# Patient Record
Sex: FEMALE | Race: WHITE | Employment: UNEMPLOYED | RURAL
[De-identification: names, ages, dates, MRNs, and addresses within clinical notes are randomized per-mention and may not be internally consistent; named-entity substitution may affect disease eponyms.]

---

## 2017-01-19 ENCOUNTER — OFFICE VISIT (OUTPATIENT)
Dept: FAMILY MEDICINE CLINIC | Age: 52
End: 2017-01-19

## 2017-01-19 VITALS
SYSTOLIC BLOOD PRESSURE: 120 MMHG | HEART RATE: 71 BPM | HEIGHT: 70 IN | BODY MASS INDEX: 27.35 KG/M2 | DIASTOLIC BLOOD PRESSURE: 80 MMHG | TEMPERATURE: 97.5 F | OXYGEN SATURATION: 97 % | WEIGHT: 191 LBS | RESPIRATION RATE: 16 BRPM

## 2017-01-19 DIAGNOSIS — Z12.11 SCREENING FOR COLON CANCER: ICD-10-CM

## 2017-01-19 DIAGNOSIS — R53.83 MALAISE AND FATIGUE: ICD-10-CM

## 2017-01-19 DIAGNOSIS — R53.81 MALAISE AND FATIGUE: ICD-10-CM

## 2017-01-19 DIAGNOSIS — F41.0 PANIC ATTACKS: Primary | ICD-10-CM

## 2017-01-19 DIAGNOSIS — Z01.419 WELL WOMAN EXAM: ICD-10-CM

## 2017-01-19 DIAGNOSIS — Z00.00 LABORATORY EXAM ORDERED AS PART OF ROUTINE GENERAL MEDICAL EXAMINATION: ICD-10-CM

## 2017-01-19 DIAGNOSIS — Z11.59 SCREENING FOR VIRAL DISEASE: ICD-10-CM

## 2017-01-19 RX ORDER — ESCITALOPRAM OXALATE 10 MG/1
10 TABLET ORAL DAILY
Qty: 90 TAB | Refills: 1 | Status: SHIPPED | OUTPATIENT
Start: 2017-01-19 | End: 2017-04-19

## 2017-01-19 RX ORDER — ESCITALOPRAM OXALATE 10 MG/1
10 TABLET ORAL
COMMUNITY
Start: 2016-11-07 | End: 2017-09-01 | Stop reason: SDUPTHER

## 2017-01-19 NOTE — MR AVS SNAPSHOT
Visit Information Date & Time Provider Department Dept. Phone Encounter #  
 1/19/2017  1:00 PM Bethel Quezada [phone] [id number] Upcoming Health Maintenance Date Due Hepatitis C Screening 1965 DTaP/Tdap/Td series (1 - Tdap) 4/20/1986 PAP AKA CERVICAL CYTOLOGY 4/20/1986 BREAST CANCER SCRN MAMMOGRAM 4/20/2015 FOBT Q 1 YEAR AGE 50-75 4/20/2015 Allergies as of 1/19/2017  Review Complete On: 1/19/2017 By: Dennie Laos, LPN No Known Allergies Current Immunizations  Never Reviewed No immunizations on file. Not reviewed this visit You Were Diagnosed With   
  
 Codes Comments Panic attacks    -  Primary ICD-10-CM: F41.0 ICD-9-CM: 300.01 Well woman exam     ICD-10-CM: I51.364 ICD-9-CM: V72.31 Laboratory exam ordered as part of routine general medical examination     ICD-10-CM: Z00.00 ICD-9-CM: V72.62 Malaise and fatigue     ICD-10-CM: R53.81, R53.83 ICD-9-CM: 780.79 Vitals BP Pulse Temp Resp Height(growth percentile) Weight(growth percentile) 120/80 (BP 1 Location: Right arm, BP Patient Position: Sitting) 71 97.5 °F (36.4 °C) (Oral) 16 5' 10\" (1.778 m) 191 lb (86.6 kg) SpO2 BMI OB Status Smoking Status 97% 27.41 kg/m2 Menopause Former Smoker Vitals History BMI and BSA Data Body Mass Index Body Surface Area  
 27.41 kg/m 2 2.07 m 2 Preferred Pharmacy Pharmacy Name Phone [facility] [address] [place] [address] [phone] Your Updated Medication List  
  
   
This list is accurate as of: 1/19/17  1:34 PM.  Always use your most recent med list.  
  
  
  
  
 aspirin 325 mg tablet Commonly known as:  ASPIRIN Take 325 mg by mouth daily. calcium-cholecalciferol (D3) tablet Commonly known as:  CALTRATE 600+D Take 1 Tab by mouth daily. * escitalopram oxalate 10 mg tablet Commonly known as:  Jeremy Hands Take 10 mg by mouth once over twenty-four (24) hours. * escitalopram oxalate 10 mg tablet Commonly known as:  Tyrell Clifton Take 1 Tab by mouth daily for 90 days. multivitamin tablet Commonly known as:  ONE A DAY Take 1 Tab by mouth daily. omega-3 fatty acids-vitamin e 1,000 mg Cap Take 1 Cap by mouth. * Notice: This list has 2 medication(s) that are the same as other medications prescribed for you. Read the directions carefully, and ask your doctor or other care provider to review them with you. Prescriptions Sent to Pharmacy Refills  
 escitalopram oxalate (LEXAPRO) 10 mg tablet 1 Sig: Take 1 Tab by mouth daily for 90 days. Class: Normal  
 Pharmacy: [id number] #: [phone] Route: Oral  
  
We Performed the Following CBC WITH AUTOMATED DIFF [75924 CPT(R)] CRP, HIGH SENSITIVITY [78876 CPT(R)] LIPID PANEL [56366 CPT(R)] METABOLIC PANEL, COMPREHENSIVE [47128 CPT(R)] THYROID CASCADE PROFILE [[id number] Custom] VITAMIN D, 25 HYDROXY [id number] CPT(R)] Patient Instructions Panic Attacks: Care Instructions Your Care Instructions During a panic attack, you may have a feeling of intense fear or terror, trouble breathing, chest pain or tightness, heartbeat changes, dizziness, sweating, and shaking. A panic attack starts suddenly and usually lasts from 5 to 20 minutes but may last even longer. You have the most anxiety about 10 minutes after the attack starts. An attack can begin with a stressful event, or it can happen without a cause. Although panic attacks can cause scary symptoms, you can learn to manage them with self-care, counseling, and medicine. Follow-up care is a key part of your treatment and safety. Be sure to make and go to all appointments, and call your doctor if you are having problems.  It's also a good idea to know your test results and keep a list of the medicines you take. How can you care for yourself at home? · Take your medicine exactly as directed. Call your doctor if you think you are having a problem with your medicine. · Go to your counseling sessions and follow-up appointments. · Recognize and accept your anxiety. Then, when you are in a situation that makes you anxious, say to yourself, \"This is not an emergency. I feel uncomfortable, but I am not in danger. I can keep going even if I feel anxious. \" · Be kind to your body: ¨ Relieve tension with exercise or a massage. ¨ Get enough rest. 
¨ Avoid alcohol, caffeine, nicotine, and illegal drugs. They can increase your anxiety level, cause sleep problems, or trigger a panic attack. ¨ Learn and do relaxation techniques. See below for more about these techniques. · Engage your mind. Get out and do something you enjoy. Go to a funny movie, or take a walk or hike. Plan your day. Having too much or too little to do can make you anxious. · Keep a record of your symptoms. Discuss your fears with a good friend or family member, or join a support group for people with similar problems. Talking to others sometimes relieves stress. · Get involved in social groups, or volunteer to help others. Being alone sometimes makes things seem worse than they are. · Get at least 30 minutes of exercise on most days of the week to relieve stress. Walking is a good choice. You also may want to do other activities, such as running, swimming, cycling, or playing tennis or team sports. Relaxation techniques Do relaxation exercises for 10 to 20 minutes a day. You can play soothing, relaxing music while you do them, if you wish. · Tell others in your house that you are going to do your relaxation exercises. Ask them not to disturb you. · Find a comfortable place, away from all distractions and noise. · Lie down on your back, or sit with your back straight. · Focus on your breathing. Make it slow and steady. · Breathe in through your nose. Breathe out through either your nose or mouth. · Breathe deeply, filling up the area between your navel and your rib cage. Breathe so that your belly goes up and down. · Do not hold your breath. · Breathe like this for 5 to 10 minutes. Notice the feeling of calmness throughout your whole body. As you continue to breathe slowly and deeply, relax by doing the following for another 5 to 10 minutes: · Tighten and relax each muscle group in your body. You can begin at your toes and work your way up to your head. · Imagine your muscle groups relaxing and becoming heavy. · Empty your mind of all thoughts. · Let yourself relax more and more deeply. · Become aware of the state of calmness that surrounds you. · When your relaxation time is over, you can bring yourself back to alertness by moving your fingers and toes and then your hands and feet and then stretching and moving your entire body. Sometimes people fall asleep during relaxation, but they usually wake up shortly afterward. · Always give yourself time to return to full alertness before you drive a car or do anything that might cause an accident if you are not fully alert. Never play a relaxation tape while driving a car. When should you call for help? Call 911 anytime you think you may need emergency care. For example, call if: 
· You feel you cannot stop from hurting yourself or someone else. Watch closely for changes in your health, and be sure to contact your doctor if: 
· Your panic attacks get worse. · You have new or different anxiety. · You are not getting better as expected. Where can you learn more? Go to http://stu-valentina.info/. Enter H601 in the search box to learn more about \"Panic Attacks: Care Instructions. \" Current as of: July 26, 2016 Content Version: 11.1 © 7205-5497 [employer].  Care instructions adapted under license by [address] (which disclaims liability or warranty for this information). If you have questions about a medical condition or this instruction, always ask your healthcare professional. [address] any warranty or liability for your use of this information. Introducing [facility]! Seema Rodriguez introduces [employer] patient portal. Now you can access parts of your medical record, email your doctor's office, and request medication refills online. 1. In your internet browser, go to [email or web address] 2. Click on the First Time User? Click Here link in the Sign In box. You will see the New Member Sign Up page. 3. Enter your [employer] Access Code exactly as it appears below. You will not need to use this code after youve completed the sign-up process. If you do not sign up before the expiration date, you must request a new code. · [employer] Access Code: [email or web address] Expires: 4/19/2017 12:59 PM 
 
4. Enter the last four digits of your Social Security Number (xxxx) and Date of Birth (mm/dd/yyyy) as indicated and click Submit. You will be taken to the next sign-up page. 5. Create a [employer] ID. This will be your [employer] login ID and cannot be changed, so think of one that is secure and easy to remember. 6. Create a [employer] password. You can change your password at any time. 7. Enter your Password Reset Question and Answer. This can be used at a later time if you forget your password. 8. Enter your e-mail address. You will receive e-mail notification when new information is available in [address]. 9. Click Sign Up. You can now view and download portions of your medical record. 10. Click the Download Summary menu link to download a portable copy of your medical information. If you have questions, please visit the Frequently Asked Questions section of the [employer] website.  Remember, [employer] is NOT to be used for urgent needs. For medical emergencies, dial 911. Now available from your iPhone and Android! Please provide this summary of care documentation to your next provider. Your primary care clinician is listed as Constanza Kruse. If you have any questions after today's visit, please call [phone].

## 2017-01-19 NOTE — PROGRESS NOTES
Identified pt with two pt identifiers(name and ). Chief Complaint   Patient presents with    Panic Attack     is on lexapro to help control panic attacks and is here as she is due for a refill - this was initially prescribed from [facility]  before she moved to [facility] Maintenance Due   Topic    Hepatitis C Screening     DTaP/Tdap/Td series (1 - Tdap)    PAP AKA CERVICAL CYTOLOGY     BREAST CANCER SCRN MAMMOGRAM     FOBT Q 1 YEAR AGE 50-75     INFLUENZA AGE 9 TO ADULT        Wt Readings from Last 3 Encounters:   17 191 lb (86.6 kg)   16 168 lb (76.2 kg)   08/07/15 152 lb (68.9 kg)     Temp Readings from Last 3 Encounters:   16 98.6 °F (37 °C) (Oral)   08/07/15 98.1 °F (36.7 °C)     BP Readings from Last 3 Encounters:   16 108/74   08/07/15 150/83     Pulse Readings from Last 3 Encounters:   16 71   08/07/15 72         Learning Assessment:  :     Learning Assessment 2016   PRIMARY LEARNER Patient   HIGHEST LEVEL OF EDUCATION - PRIMARY LEARNER  DID NOT GRADUATE HIGH SCHOOL   BARRIERS PRIMARY LEARNER NONE   CO-LEARNER CAREGIVER No   PRIMARY LANGUAGE ENGLISH   LEARNER PREFERENCE PRIMARY OTHER (COMMENT)   ANSWERED BY self   RELATIONSHIP SELF       Depression Screening:  :     PHQ 2 / 9, over the last two weeks 2017   Little interest or pleasure in doing things Not at all   Feeling down, depressed or hopeless Not at all   Total Score PHQ 2 0       Fall Risk Assessment:  :     Fall Risk Assessment, last 12 mths 2016   Able to walk? Yes   Fall in past 12 months? No       Abuse Screening:  :     Abuse Screening Questionnaire 2016   Do you ever feel afraid of your partner? N   Are you in a relationship with someone who physically or mentally threatens you? N   Is it safe for you to go home?  Y       Coordination of Care Questionnaire:  :     1) Have you been to an emergency room, urgent care clinic since your last visit? no   Hospitalized since your last visit? no             2) Have you seen or consulted any other health care providers outside of [address] [place] [address] since your last visit? no  (Include any pap smears or colon screenings in this section.)    3) Do you have an Advance Directive on file? no  Are you interested in receiving information about Advance Directives? no    Patient is accompanied by self. I have received verbal consent from Tricia Keller to discuss any/all medical information while they are present in the room. Reviewed record in preparation for visit and have obtained necessary documentation.   Medication reconciliation up to date and corrected with patient at this time.   '

## 2017-01-19 NOTE — PATIENT INSTRUCTIONS
Panic Attacks: Care Instructions  Your Care Instructions  During a panic attack, you may have a feeling of intense fear or terror, trouble breathing, chest pain or tightness, heartbeat changes, dizziness, sweating, and shaking. A panic attack starts suddenly and usually lasts from 5 to 20 minutes but may last even longer. You have the most anxiety about 10 minutes after the attack starts. An attack can begin with a stressful event, or it can happen without a cause. Although panic attacks can cause scary symptoms, you can learn to manage them with self-care, counseling, and medicine. Follow-up care is a key part of your treatment and safety. Be sure to make and go to all appointments, and call your doctor if you are having problems. It's also a good idea to know your test results and keep a list of the medicines you take. How can you care for yourself at home? · Take your medicine exactly as directed. Call your doctor if you think you are having a problem with your medicine. · Go to your counseling sessions and follow-up appointments. · Recognize and accept your anxiety. Then, when you are in a situation that makes you anxious, say to yourself, \"This is not an emergency. I feel uncomfortable, but I am not in danger. I can keep going even if I feel anxious. \"  · Be kind to your body:  ¨ Relieve tension with exercise or a massage. ¨ Get enough rest.  ¨ Avoid alcohol, caffeine, nicotine, and illegal drugs. They can increase your anxiety level, cause sleep problems, or trigger a panic attack. ¨ Learn and do relaxation techniques. See below for more about these techniques. · Engage your mind. Get out and do something you enjoy. Go to a funny movie, or take a walk or hike. Plan your day. Having too much or too little to do can make you anxious. · Keep a record of your symptoms. Discuss your fears with a good friend or family member, or join a support group for people with similar problems.  Talking to others sometimes relieves stress. · Get involved in social groups, or volunteer to help others. Being alone sometimes makes things seem worse than they are. · Get at least 30 minutes of exercise on most days of the week to relieve stress. Walking is a good choice. You also may want to do other activities, such as running, swimming, cycling, or playing tennis or team sports. Relaxation techniques  Do relaxation exercises for 10 to 20 minutes a day. You can play soothing, relaxing music while you do them, if you wish. · Tell others in your house that you are going to do your relaxation exercises. Ask them not to disturb you. · Find a comfortable place, away from all distractions and noise. · Lie down on your back, or sit with your back straight. · Focus on your breathing. Make it slow and steady. · Breathe in through your nose. Breathe out through either your nose or mouth. · Breathe deeply, filling up the area between your navel and your rib cage. Breathe so that your belly goes up and down. · Do not hold your breath. · Breathe like this for 5 to 10 minutes. Notice the feeling of calmness throughout your whole body. As you continue to breathe slowly and deeply, relax by doing the following for another 5 to 10 minutes:  · Tighten and relax each muscle group in your body. You can begin at your toes and work your way up to your head. · Imagine your muscle groups relaxing and becoming heavy. · Empty your mind of all thoughts. · Let yourself relax more and more deeply. · Become aware of the state of calmness that surrounds you. · When your relaxation time is over, you can bring yourself back to alertness by moving your fingers and toes and then your hands and feet and then stretching and moving your entire body. Sometimes people fall asleep during relaxation, but they usually wake up shortly afterward.   · Always give yourself time to return to full alertness before you drive a car or do anything that might cause an accident if you are not fully alert. Never play a relaxation tape while driving a car. When should you call for help? Call 911 anytime you think you may need emergency care. For example, call if:  · You feel you cannot stop from hurting yourself or someone else. Watch closely for changes in your health, and be sure to contact your doctor if:  · Your panic attacks get worse. · You have new or different anxiety. · You are not getting better as expected. Where can you learn more? Go to http://stu-valentina.info/. Enter H601 in the search box to learn more about \"Panic Attacks: Care Instructions. \"  Current as of: July 26, 2016  Content Version: 11.1  © 9695-8517 [employer]. Care instructions adapted under license by [employer] (which disclaims liability or warranty for this information). If you have questions about a medical condition or this instruction, always ask your healthcare professional. [place] [address] any warranty or liability for your use of this information.

## 2017-01-25 NOTE — PROGRESS NOTES
CC:  Chief Complaint   Patient presents with    Panic Attack     is on lexapro to help control panic attacks and is here as she is due for a refill - this was initially prescribed from [facility]  before she moved to [address] [place] [address] Ravinder Abreu is a 46 y.o. female. HPI Comments: Who presents today for refill of Lexapro. Has done well on this medication and ran out of medication which had been previously prescribed by the [facility]. Needs refills. Finds medication helpful and tolerates. In addition, reviewed HM and she needs fasting labs and well woman evaluation in the near future. No other major medical concerns. Panic Attack           ROS:  Review of Systems   All other systems reviewed and are negative. OBJECTIVE:  Visit Vitals    /80 (BP 1 Location: Right arm, BP Patient Position: Sitting)    Pulse 71    Temp 97.5 °F (36.4 °C) (Oral)    Resp 16    Ht 5' 10\" (1.778 m)    Wt 191 lb (86.6 kg)    SpO2 97%    BMI 27.41 kg/m2   Physical Exam   Constitutional: She is oriented to person, place, and time. HENT:   Head: Normocephalic. Eyes: Pupils are equal, round, and reactive to light. Neck: Normal range of motion. Cardiovascular: Normal rate and regular rhythm. Pulmonary/Chest: Effort normal and breath sounds normal.   Musculoskeletal: Normal range of motion. Neurological: She is alert and oriented to person, place, and time. She has normal reflexes. Skin: Skin is warm. Nursing note and vitals reviewed. ASSESSMENT and PLAN    ICD-10-CM ICD-9-CM    1. Panic attacks F41.0 300.01 escitalopram oxalate (LEXAPRO) 10 mg tablet   2. Well woman exam Z01.419 V72.31    3. Laboratory exam ordered as part of routine general medical examination Z00.00 V72.62 LIPID PANEL      CRP, HIGH SENSITIVITY      VITAMIN D, 25 HYDROXY      METABOLIC PANEL, COMPREHENSIVE   4.  Malaise and fatigue R53.81 780.79 CBC WITH AUTOMATED DIFF    R53.83 THYROID CASCADE PROFILE   5. Screening for viral disease Z11.59 V73.99 HEPATITIS C AB   6. Screening for colon cancer Z12.11 V76.51 OCCULT BLOOD, IMMUNOASSAY (FIT)     51F with h/o panic attacks. Here for refill of Lexapro which has been working well for her. Refill given. Will send for labs as outlined above to complete HM. She does need a well woman exam as soon as possible. Will advise when labs return. Pt verbalizes understanding of plan of care and denies further questions or concerns at this time. Follow-up Disposition:  Return if symptoms worsen or fail to improve.

## 2017-02-10 ENCOUNTER — TELEPHONE (OUTPATIENT)
Dept: FAMILY MEDICINE CLINIC | Age: 52
End: 2017-02-10

## 2017-02-10 NOTE — TELEPHONE ENCOUNTER
Pt was advised that Dr. Meron Guerrero is now in receipt of her labs that were done 01/23/17 and they are normal at this time. Pt verbalized understanding.

## 2017-05-12 ENCOUNTER — OFFICE VISIT (OUTPATIENT)
Dept: FAMILY MEDICINE CLINIC | Age: 52
End: 2017-05-12

## 2017-05-12 VITALS
HEIGHT: 70 IN | WEIGHT: 191 LBS | BODY MASS INDEX: 27.35 KG/M2 | HEART RATE: 72 BPM | OXYGEN SATURATION: 99 % | RESPIRATION RATE: 16 BRPM | DIASTOLIC BLOOD PRESSURE: 80 MMHG | TEMPERATURE: 97.7 F | SYSTOLIC BLOOD PRESSURE: 130 MMHG

## 2017-05-12 DIAGNOSIS — J02.9 SORE THROAT: Primary | ICD-10-CM

## 2017-05-12 LAB
S PYO AG THROAT QL: NEGATIVE
VALID INTERNAL CONTROL?: YES

## 2017-05-12 RX ORDER — AMOXICILLIN 875 MG/1
875 TABLET, FILM COATED ORAL 2 TIMES DAILY
Qty: 20 TAB | Refills: 0 | Status: SHIPPED | OUTPATIENT
Start: 2017-05-12 | End: 2017-05-22

## 2017-05-12 NOTE — PROGRESS NOTES
Identified pt with two pt identifiers(name and ). Chief Complaint   Patient presents with    Sore Throat        Health Maintenance Due   Topic    Hepatitis C Screening     DTaP/Tdap/Td series (1 - Tdap)    PAP AKA CERVICAL CYTOLOGY     BREAST CANCER SCRN MAMMOGRAM     FOBT Q 1 YEAR AGE 50-75        Wt Readings from Last 3 Encounters:   17 191 lb (86.6 kg)   17 191 lb (86.6 kg)   16 168 lb (76.2 kg)     Temp Readings from Last 3 Encounters:   17 97.7 °F (36.5 °C) (Oral)   17 97.5 °F (36.4 °C) (Oral)   16 98.6 °F (37 °C) (Oral)     BP Readings from Last 3 Encounters:   17 130/80   17 120/80   16 108/74     Pulse Readings from Last 3 Encounters:   17 72   17 71   16 71         Learning Assessment:  :     Learning Assessment 2016   PRIMARY LEARNER Patient   HIGHEST LEVEL OF EDUCATION - PRIMARY LEARNER  DID NOT GRADUATE HIGH SCHOOL   BARRIERS PRIMARY LEARNER NONE   CO-LEARNER CAREGIVER No   PRIMARY LANGUAGE ENGLISH   LEARNER PREFERENCE PRIMARY OTHER (COMMENT)   ANSWERED BY self   RELATIONSHIP SELF       Depression Screening:  :     PHQ over the last two weeks 2017   PHQ Not Done -   Little interest or pleasure in doing things Not at all   Feeling down, depressed or hopeless Not at all   Total Score PHQ 2 0       Fall Risk Assessment:  :     Fall Risk Assessment, last 12 mths 2016   Able to walk? Yes   Fall in past 12 months? No       Abuse Screening:  :     Abuse Screening Questionnaire 2016   Do you ever feel afraid of your partner? N   Are you in a relationship with someone who physically or mentally threatens you? N   Is it safe for you to go home? Y       Coordination of Care Questionnaire:  :     1) Have you been to an emergency room, urgent care clinic since your last visit? No  Hospitalized since your last visit?  No    2) Have you seen or consulted any other health care providers outside of [facility] since your last visit? No    3) Do you have an Advance Directive on file? No    Are you interested in receiving information about Advance Directives? No    Patient is accompanied by self I have received verbal consent from Cristofer Weinstein to discuss any/all medical information while they are present in the room. Reviewed record in preparation for visit and have obtained necessary documentation. Medication reconciliation up to date and corrected with patient at this time.

## 2017-05-12 NOTE — MR AVS SNAPSHOT
Visit Information Date & Time Provider Department Dept. Phone Encounter #  
 5/59/4822  9:72 PM [facility] [phone] [id number] Upcoming Health Maintenance Date Due Hepatitis C Screening 1965 DTaP/Tdap/Td series (1 - Tdap) 4/20/1986 PAP AKA CERVICAL CYTOLOGY 4/20/1986 BREAST CANCER SCRN MAMMOGRAM 4/20/2015 FOBT Q 1 YEAR AGE 50-75 4/20/2015 INFLUENZA AGE 9 TO ADULT 8/1/2017 Allergies as of 5/12/2017  Review Complete On: 7/31/2178 By: Marian Cedeno MD  
 No Known Allergies Current Immunizations  Never Reviewed No immunizations on file. Not reviewed this visit You Were Diagnosed With   
  
 Codes Comments Sore throat    -  Primary ICD-10-CM: J02.9 ICD-9-CM: 198 Vitals BP Pulse Temp Resp Height(growth percentile) Weight(growth percentile) 130/80 72 97.7 °F (36.5 °C) (Oral) 16 5' 10\" (1.778 m) 191 lb (86.6 kg) SpO2 BMI OB Status Smoking Status 99% 27.41 kg/m2 Menopause Former Smoker BMI and BSA Data Body Mass Index Body Surface Area  
 27.41 kg/m 2 2.07 m 2 Preferred Pharmacy Pharmacy Name Phone [facility] [place] [facility] [address] [place] [address] [phone] Your Updated Medication List  
  
   
This list is accurate as of: 5/12/17  3:57 PM.  Always use your most recent med list.  
  
  
  
  
 amoxicillin 875 mg tablet Commonly known as:  AMOXIL Take 1 Tab by mouth two (2) times a day for 10 days. aspirin 325 mg tablet Commonly known as:  ASPIRIN Take 325 mg by mouth daily. calcium-cholecalciferol (D3) tablet Commonly known as:  CALTRATE 600+D Take 1 Tab by mouth daily. escitalopram oxalate 10 mg tablet Commonly known as:  Aubree Ode Take 10 mg by mouth once over twenty-four (24) hours. multivitamin tablet Commonly known as:  ONE A DAY Take 1 Tab by mouth daily. omega-3 fatty acids-vitamin e 1,000 mg Cap Take 1 Cap by mouth. Prescriptions Sent to Pharmacy Refills  
 amoxicillin (AMOXIL) 875 mg tablet 0 Sig: Take 1 Tab by mouth two (2) times a day for 10 days. Class: Normal  
 Pharmacy: [facility] [address] [address] [place]  #: [phone] Route: Oral  
  
We Performed the Following AMB POC RAPID STREP A [41653 CPT(R)] Patient Instructions Upper Respiratory Infection (Cold): Care Instructions Your Care Instructions An upper respiratory infection, or URI, is an infection of the nose, sinuses, or throat. URIs are spread by coughs, sneezes, and direct contact. The common cold is the most frequent kind of URI. The flu and sinus infections are other kinds of URIs. Almost all URIs are caused by viruses. Antibiotics won't cure them. But you can treat most infections with home care. This may include drinking lots of fluids and taking over-the-counter pain medicine. You will probably feel better in 4 to 10 days. The doctor has checked you carefully, but problems can develop later. If you notice any problems or new symptoms, get medical treatment right away. Follow-up care is a key part of your treatment and safety. Be sure to make and go to all appointments, and call your doctor if you are having problems. It's also a good idea to know your test results and keep a list of the medicines you take. How can you care for yourself at home? · To prevent dehydration, drink plenty of fluids, enough so that your urine is light yellow or clear like water. Choose water and other caffeine-free clear liquids until you feel better. If you have kidney, heart, or liver disease and have to limit fluids, talk with your doctor before you increase the amount of fluids you drink. · Take an over-the-counter pain medicine, such as acetaminophen (Tylenol), ibuprofen (Advil, Motrin), or naproxen (Aleve).  Read and follow all instructions on the label. · Before you use cough and cold medicines, check the label. These medicines may not be safe for young children or for people with certain health problems. · Be careful when taking over-the-counter cold or flu medicines and Tylenol at the same time. Many of these medicines have acetaminophen, which is Tylenol. Read the labels to make sure that you are not taking more than the recommended dose. Too much acetaminophen (Tylenol) can be harmful. · Get plenty of rest. 
· Do not smoke or allow others to smoke around you. If you need help quitting, talk to your doctor about stop-smoking programs and medicines. These can increase your chances of quitting for good. When should you call for help? Call 911 anytime you think you may need emergency care. For example, call if: 
· You have severe trouble breathing. Call your doctor now or seek immediate medical care if: 
· You seem to be getting much sicker. · You have new or worse trouble breathing. · You have a new or higher fever. · You have a new rash. Watch closely for changes in your health, and be sure to contact your doctor if: 
· You have a new symptom, such as a sore throat, an earache, or sinus pain. · You cough more deeply or more often, especially if you notice more mucus or a change in the color of your mucus. · You do not get better as expected. Where can you learn more? Go to http://stu-valentina.info/. Enter U694 in the search box to learn more about \"Upper Respiratory Infection (Cold): Care Instructions. \" Current as of: June 30, 2016 Content Version: 11.2 © 9992-5642 [employer]. Care instructions adapted under license by [employer] (which disclaims liability or warranty for this information).  If you have questions about a medical condition or this instruction, always ask your healthcare professional. [employer] disclaims any warranty or liability for your use of this information. Introducing [facility] & HEALTH SERVICES! Toribio Nyhan introduces ChronoWake patient portal. Now you can access parts of your medical record, email your doctor's office, and request medication refills online. 1. In your internet browser, go to [email or web address] 2. Click on the First Time User? Click Here link in the Sign In box. You will see the New Member Sign Up page. 3. Enter your ChronoWake Access Code exactly as it appears below. You will not need to use this code after youve completed the sign-up process. If you do not sign up before the expiration date, you must request a new code. · ChronoWake Access Code: [email or web address] Expires: 8/10/2017  3:36 PM 
 
4. Enter the last four digits of your Social Security Number (xxxx) and Date of Birth (mm/dd/yyyy) as indicated and click Submit. You will be taken to the next sign-up page. 5. Create a ChronoWake ID. This will be your ChronoWake login ID and cannot be changed, so think of one that is secure and easy to remember. 6. Create a ChronoWake password. You can change your password at any time. 7. Enter your Password Reset Question and Answer. This can be used at a later time if you forget your password. 8. Enter your e-mail address. You will receive e-mail notification when new information is available in [address] 9. Click Sign Up. You can now view and download portions of your medical record. 10. Click the Download Summary menu link to download a portable copy of your medical information. If you have questions, please visit the Frequently Asked Questions section of the ChronoWake website. Remember, ChronoWake is NOT to be used for urgent needs. For medical emergencies, dial 911. Now available from your iPhone and Android! Please provide this summary of care documentation to your next provider. Your primary care clinician is listed as Constanza Kruse. If you have any questions after today's visit, please call [phone].

## 2017-05-12 NOTE — PATIENT INSTRUCTIONS

## 2017-05-12 NOTE — PROGRESS NOTES
Subjective:   Michael Andrea is a 46 y.o. female who complains of coryza, congestion and sore throat for 3 days, gradually worsening since that time. She denies a history of shortness of breath and wheezing. Evaluation to date: none. Treatment to date: none. Patient does not smoke cigarettes. Relevant PMH: No pertinent additional PMH. There are no active problems to display for this patient. No Known Allergies  Past Medical History:   Diagnosis Date    Depression     after   2012     Past Surgical History:   Procedure Laterality Date    HX  SECTION  29 yrs ago     HX  SECTION  32 years ago   [employer] HX GYN       Family History   Problem Relation Age of Onset    Family history unknown: Yes     Social History   Substance Use Topics    Smoking status: Former Smoker    Smokeless tobacco: Never Used    Alcohol use No        Review of Systems  Pertinent items are noted in HPI. Objective: There were no vitals taken for this visit. General:  alert, cooperative, no distress   Eyes: negative   Ears: normal TM's and external ear canals AU   Sinuses: Normal paranasal sinuses without tenderness   Mouth:  abnormal findings: moderate oropharyngeal erythema   Neck: supple, symmetrical, trachea midline and no adenopathy. Heart: S1 and S2 normal, no murmurs noted. Lungs: clear to auscultation bilaterally   Abdomen:         Results for orders placed or performed in visit on 17   AMB POC RAPID STREP A   Result Value Ref Range    VALID INTERNAL CONTROL POC Yes     Group A Strep Ag Negative Negative       Assessment/Plan:         ICD-10-CM ICD-9-CM    1. Sore throat J02.9 462 AMB POC RAPID STREP A      amoxicillin (AMOXIL) 875 mg tablet   .

## 2017-09-06 RX ORDER — ESCITALOPRAM OXALATE 10 MG/1
10 TABLET ORAL
Qty: 30 TAB | Refills: 5 | Status: SHIPPED | OUTPATIENT
Start: 2017-09-06 | End: 2018-06-10 | Stop reason: SDUPTHER

## 2018-08-06 ENCOUNTER — OFFICE VISIT (OUTPATIENT)
Dept: FAMILY MEDICINE CLINIC | Age: 53
End: 2018-08-06

## 2018-08-06 VITALS
WEIGHT: 211 LBS | HEIGHT: 70 IN | RESPIRATION RATE: 18 BRPM | SYSTOLIC BLOOD PRESSURE: 118 MMHG | TEMPERATURE: 98 F | DIASTOLIC BLOOD PRESSURE: 68 MMHG | OXYGEN SATURATION: 95 % | BODY MASS INDEX: 30.21 KG/M2 | HEART RATE: 62 BPM

## 2018-08-06 DIAGNOSIS — F41.9 ANXIETY: Primary | ICD-10-CM

## 2018-08-06 DIAGNOSIS — F41.0 PANIC DISORDER: ICD-10-CM

## 2018-08-06 DIAGNOSIS — Z01.89 ENCOUNTER FOR ROUTINE LABORATORY TESTING: ICD-10-CM

## 2018-08-06 RX ORDER — ESCITALOPRAM OXALATE 10 MG/1
TABLET ORAL
Qty: 90 TAB | Refills: 3 | Status: SHIPPED | OUTPATIENT
Start: 2018-08-06 | End: 2018-11-02 | Stop reason: SDUPTHER

## 2018-08-06 NOTE — MR AVS SNAPSHOT
[facility] [place] 
 
 
 [facility] [address] [facility] 
[phone] Patient: Paxton Suarez MRN: [id number] OYE:[birth year] Visit Information Date & Time Provider Department Dept. Phone Encounter #  
 8/6/2018  9:45 AM [facility] [phone] [id number] Follow-up Instructions Return in about 6 months (around 2/6/2019). Upcoming Health Maintenance Date Due Hepatitis C Screening 1965 DTaP/Tdap/Td series (1 - Tdap) 4/20/1986 PAP AKA CERVICAL CYTOLOGY 4/20/1986 BREAST CANCER SCRN MAMMOGRAM 4/20/2015 FOBT Q 1 YEAR AGE 50-75 4/20/2015 Influenza Age 5 to Adult 8/1/2018 Allergies as of 8/6/2018  Review Complete On: 8/6/2018 By: Sully Martines MD  
 No Known Allergies Current Immunizations  Never Reviewed No immunizations on file. Not reviewed this visit You Were Diagnosed With   
  
 Codes Comments Anxiety    -  Primary ICD-10-CM: F41.9 ICD-9-CM: 300.00 Panic disorder     ICD-10-CM: F41.0 ICD-9-CM: 300.01 Encounter for routine laboratory testing     ICD-10-CM: Z00.00 ICD-9-CM: V72.60 Vitals BP Pulse Temp Resp Height(growth percentile) Weight(growth percentile)  
 118/68 (BP 1 Location: Right arm, BP Patient Position: Sitting) 62 98 °F (36.7 °C) (Oral) 18 5' 10\" (1.778 m) 211 lb (95.7 kg) SpO2 BMI OB Status Smoking Status 95% 30.28 kg/m2 Menopause Former Smoker Vitals History BMI and BSA Data Body Mass Index Body Surface Area  
 30.28 kg/m 2 2.17 m 2 Preferred Pharmacy Pharmacy Name Phone [address] [phone] Your Updated Medication List  
  
   
This list is accurate as of 8/6/18 11:06 AM.  Always use your most recent med list.  
  
  
  
  
 aspirin 325 mg tablet Commonly known as:  ASPIRIN Take 325 mg by mouth daily. calcium-cholecalciferol (D3) tablet Commonly known as:  CALTRATE 600+D Take 1 Tab by mouth daily. escitalopram oxalate 10 mg tablet Commonly known as:  Myrtle Bares TAKE ONE TABLET BY MOUTH ONCE OVER 24 HOURS  
  
 multivitamin tablet Commonly known as:  ONE A DAY Take 1 Tab by mouth daily. omega-3 fatty acids-vitamin e 1,000 mg Cap Take 1 Cap by mouth. Prescriptions Sent to Pharmacy Refills  
 escitalopram oxalate (LEXAPRO) 10 mg tablet 3 Sig: TAKE ONE TABLET BY MOUTH ONCE OVER 24 HOURS Class: Normal  
 Pharmacy: [address] [facility] Ph #: [phone] We Performed the Following CBC WITH AUTOMATED DIFF [87590 CPT(R)] METABOLIC PANEL, COMPREHENSIVE [01579 CPT(R)] T4, FREE [id number] CPT(R)] TSH 3RD GENERATION [51763 CPT(R)] Follow-up Instructions Return in about 6 months (around 2/6/2019). Patient Instructions A Healthy Lifestyle: Care Instructions Your Care Instructions A healthy lifestyle can help you feel good, stay at a healthy weight, and have plenty of energy for both work and play. A healthy lifestyle is something you can share with your whole family. A healthy lifestyle also can lower your risk for serious health problems, such as high blood pressure, heart disease, and diabetes. You can follow a few steps listed below to improve your health and the health of your family. Follow-up care is a key part of your treatment and safety. Be sure to make and go to all appointments, and call your doctor if you are having problems. It's also a good idea to know your test results and keep a list of the medicines you take. How can you care for yourself at home? · Do not eat too much sugar, fat, or fast foods. You can still have dessert and treats now and then. The goal is moderation. · Start small to improve your eating habits.  Pay attention to portion sizes, drink less juice and soda pop, and eat more fruits and vegetables. ¨ Eat a healthy amount of food. A 3-ounce serving of meat, for example, is about the size of a deck of cards. Fill the rest of your plate with vegetables and whole grains. ¨ Limit the amount of soda and sports drinks you have every day. Drink more water when you are thirsty. ¨ Eat at least 5 servings of fruits and vegetables every day. It may seem like a lot, but it is not hard to reach this goal. A serving or helping is 1 piece of fruit, 1 cup of vegetables, or 2 cups of leafy, raw vegetables. Have an apple or some carrot sticks as an afternoon snack instead of a candy bar. Try to have fruits and/or vegetables at every meal. 
· Make exercise part of your daily routine. You may want to start with simple activities, such as walking, bicycling, or slow swimming. Try to be active 30 to 60 minutes every day. You do not need to do all 30 to 60 minutes all at once. For example, you can exercise 3 times a day for 10 or 20 minutes. Moderate exercise is safe for most people, but it is always a good idea to talk to your doctor before starting an exercise program. 
· Keep moving. Fadarin Bevels the lawn, work in the garden, or Quick TV. Take the stairs instead of the elevator at work. · If you smoke, quit. People who smoke have an increased risk for heart attack, stroke, cancer, and other lung illnesses. Quitting is hard, but there are ways to boost your chance of quitting tobacco for good. ¨ Use nicotine gum, patches, or lozenges. ¨ Ask your doctor about stop-smoking programs and medicines. ¨ Keep trying. In addition to reducing your risk of diseases in the future, you will notice some benefits soon after you stop using tobacco. If you have shortness of breath or asthma symptoms, they will likely get better within a few weeks after you quit. · Limit how much alcohol you drink.  Moderate amounts of alcohol (up to 2 drinks a day for men, 1 drink a day for women) are okay. But drinking too much can lead to liver problems, high blood pressure, and other health problems. Family health If you have a family, there are many things you can do together to improve your health. · Eat meals together as a family as often as possible. · Eat healthy foods. This includes fruits, vegetables, lean meats and dairy, and whole grains. · Include your family in your fitness plan. Most people think of activities such as jogging or tennis as the way to fitness, but there are many ways you and your family can be more active. Anything that makes you breathe hard and gets your heart pumping is exercise. Here are some tips: 
¨ Walk to do errands or to take your child to school or the bus. ¨ Go for a family bike ride after dinner instead of watching TV. Where can you learn more? Go to http://stu-valentina.info/. Enter S278 in the search box to learn more about \"A Healthy Lifestyle: Care Instructions. \" Current as of: December 7, 2017 Content Version: 11.7 © 5165-5115 [employer]. Care instructions adapted under license by [employer] (which disclaims liability or warranty for this information). If you have questions about a medical condition or this instruction, always ask your healthcare professional. [address] any warranty or liability for your use of this information. Introducing [facility] & HEALTH SERVICES! [facility] introduces [employer] patient portal. Now you can access parts of your medical record, email your doctor's office, and request medication refills online. 1. In your internet browser, go to [email or web address] 2. Click on the First Time User? Click Here link in the Sign In box. You will see the New Member Sign Up page. 3. Enter your Rock [employer] Access Code exactly as it appears below.  You will not need to use this code after youve completed the sign-up process. If you do not sign up before the expiration date, you must request a new code. · [employer] Access Code: [email or web address] Expires: 11/4/2018 11:06 AM 
 
4. Enter the last four digits of your Social Security Number (xxxx) and Date of Birth (mm/dd/yyyy) as indicated and click Submit. You will be taken to the next sign-up page. 5. Create a [employer] ID. This will be your [employer] login ID and cannot be changed, so think of one that is secure and easy to remember. 6. Create a [employer] password. You can change your password at any time. 7. Enter your Password Reset Question and Answer. This can be used at a later time if you forget your password. 8. Enter your e-mail address. You will receive e-mail notification when new information is available in [address] 9. Click Sign Up. You can now view and download portions of your medical record. 10. Click the Download Summary menu link to download a portable copy of your medical information. If you have questions, please visit the Frequently Asked Questions section of the [employer] website. Remember, [employer] is NOT to be used for urgent needs. For medical emergencies, dial 911. Now available from your iPhone and Android! Please provide this summary of care documentation to your next provider. Your primary care clinician is listed as [address]. If you have any questions after today's visit, please call [phone].

## 2018-08-06 NOTE — PATIENT INSTRUCTIONS

## 2018-08-06 NOTE — PROGRESS NOTES
Identified pt with two pt identifiers(name and ). No chief complaint on file. Health Maintenance Due   Topic    Hepatitis C Screening     DTaP/Tdap/Td series (1 - Tdap)    PAP AKA CERVICAL CYTOLOGY     BREAST CANCER SCRN MAMMOGRAM     FOBT Q 1 YEAR AGE 54-65     Influenza Age 5 to Adult        Wt Readings from Last 3 Encounters:   18 211 lb (95.7 kg)   17 191 lb (86.6 kg)   17 191 lb (86.6 kg)     Temp Readings from Last 3 Encounters:   17 97.7 °F (36.5 °C) (Oral)   17 97.5 °F (36.4 °C) (Oral)     BP Readings from Last 3 Encounters:   17 130/80   17 120/80   16 108/74     Pulse Readings from Last 3 Encounters:   17 72   17 71   16 71         Learning Assessment:  :     Learning Assessment 2016   PRIMARY LEARNER Patient   HIGHEST LEVEL OF EDUCATION - PRIMARY LEARNER  DID NOT GRADUATE HIGH SCHOOL   BARRIERS PRIMARY LEARNER NONE   CO-LEARNER CAREGIVER No   PRIMARY LANGUAGE ENGLISH   LEARNER PREFERENCE PRIMARY OTHER (COMMENT)   ANSWERED BY self   RELATIONSHIP SELF       Depression Screening:  :     PHQ over the last two weeks 2018   PHQ Not Done -   Little interest or pleasure in doing things Not at all   Feeling down, depressed, irritable, or hopeless Not at all   Total Score PHQ 2 0       Fall Risk Assessment:  :     Fall Risk Assessment, last 12 mths 2016   Able to walk? Yes   Fall in past 12 months? No       Abuse Screening:  :     Abuse Screening Questionnaire 2018   Do you ever feel afraid of your partner? N N   Are you in a relationship with someone who physically or mentally threatens you? N N   Is it safe for you to go home?  Y Y       Coordination of Care Questionnaire:  :     1) Have you been to an emergency room, urgent care clinic since your last visit? no   Hospitalized since your last visit? no             2) Have you seen or consulted any other health care providers outside of [facility] since your last visit? no  (Include any pap smears or colon screenings in this section.)    3) Do you have an Advance Directive on file? no  Are you interested in receiving information about Advance Directives? no    Reviewed record in preparation for visit and have obtained necessary documentation. Medication reconciliation up to date and corrected with patient at this time.

## 2018-08-06 NOTE — PROGRESS NOTES
Subjective:      Ruthie Nails is a 48 y.o. female here follow up of anxiety. She is currently on Lexapro 10 mg which she is tolerating well. She has tried to wean herself off medication but states that \"I'm not ready for that\". Denies chest pain or discomfort, dyspnea, abdominal pain, changes in bowel habits. Current Outpatient Prescriptions   Medication Sig Dispense Refill    escitalopram oxalate (LEXAPRO) 10 mg tablet TAKE ONE TABLET BY MOUTH ONCE OVER 24 HOURS 30 Tab 0    multivitamin (ONE A DAY) tablet Take 1 Tab by mouth daily.  aspirin (ASPIRIN) 325 mg tablet Take 325 mg by mouth daily.  omega-3 fatty acids-vitamin e 1,000 mg cap Take 1 Cap by mouth.  calcium-cholecalciferol, D3, (CALTRATE 600+D) tablet Take 1 Tab by mouth daily. No Known Allergies      Past Medical History:   Diagnosis Date    Depression     after   2012       Social History   Substance Use Topics    Smoking status: Former Smoker    Smokeless tobacco: Never Used    Alcohol use No        Review of Systems  Pertinent items are noted in HPI. Objective:     Visit Vitals    /68 (BP 1 Location: Right arm, BP Patient Position: Sitting)  Comment: .  Pulse 62    Temp 98 °F (36.7 °C) (Oral)  Comment: .  Resp 18    Ht 5' 10\" (1.778 m)    Wt 211 lb (95.7 kg)    SpO2 95%    BMI 30.28 kg/m2      General appearance - alert, well appearing, and in no distress  Eyes - pupils equal and reactive, extraocular eye movements intact, sclera anicteric  Chest - clear to auscultation, no wheezes, rales or rhonchi, symmetric air entry, no tachypnea, retractions or cyanosis  Heart - normal rate, regular rhythm, normal S1, S2, no murmurs, rubs, clicks or gallops  Mental Status - alert, oriented to person, place, and time, normal mood, behavior, speech, dress, motor activity, and thought processes    Assessment/Plan:   Ruthie Nails is a 48 y.o. female seen for:     1.  Anxiety: controlled, continue with current therapy. - escitalopram oxalate (LEXAPRO) 10 mg tablet; TAKE ONE TABLET BY MOUTH ONCE OVER 24 HOURS  Dispense: 90 Tab; Refill: 3    2. Panic disorder  - escitalopram oxalate (LEXAPRO) 10 mg tablet; TAKE ONE TABLET BY MOUTH ONCE OVER 24 HOURS  Dispense: 90 Tab; Refill: 3    3. Encounter for routine laboratory testing: routine labs ordered. - CBC WITH AUTOMATED DIFF  - METABOLIC PANEL, COMPREHENSIVE  - TSH 3RD GENERATION  - T4, FREE    I have discussed the diagnosis with the patient and the intended plan as seen in the above orders. The patient has received an after-visit summary and questions were answered concerning future plans. I have discussed medication side effects and warnings with the patient as well. Patient verbalizes understanding of plan of care and denies further questions or concerns at this time. Informed patient to return to the office if symptoms worsen or if new symptoms arise. Follow-up Disposition:  Return in about 6 months (around 2/6/2019).

## 2018-08-07 LAB
ALBUMIN SERPL-MCNC: 4.5 G/DL (ref 3.5–5.5)
ALBUMIN/GLOB SERPL: 1.5 {RATIO} (ref 1.2–2.2)
ALP SERPL-CCNC: 87 IU/L (ref 39–117)
ALT SERPL-CCNC: 25 IU/L (ref 0–32)
AST SERPL-CCNC: 28 IU/L (ref 0–40)
BASOPHILS # BLD AUTO: 0.1 X10E3/UL (ref 0–0.2)
BASOPHILS NFR BLD AUTO: 1 %
BILIRUB SERPL-MCNC: 0.2 MG/DL (ref 0–1.2)
BUN SERPL-MCNC: 13 MG/DL (ref 6–24)
BUN/CREAT SERPL: 22 (ref 9–23)
CALCIUM SERPL-MCNC: 9.6 MG/DL (ref 8.7–10.2)
CHLORIDE SERPL-SCNC: 102 MMOL/L (ref 96–106)
CO2 SERPL-SCNC: 24 MMOL/L (ref 20–29)
CREAT SERPL-MCNC: 0.6 MG/DL (ref 0.57–1)
EOSINOPHIL # BLD AUTO: 0.2 X10E3/UL (ref 0–0.4)
EOSINOPHIL NFR BLD AUTO: 3 %
ERYTHROCYTE [DISTWIDTH] IN BLOOD BY AUTOMATED COUNT: 13.9 % (ref 12.3–15.4)
GLOBULIN SER CALC-MCNC: 3.1 G/DL (ref 1.5–4.5)
GLUCOSE SERPL-MCNC: 92 MG/DL (ref 65–99)
HCT VFR BLD AUTO: 42.4 % (ref 34–46.6)
HGB BLD-MCNC: 13.7 G/DL (ref 11.1–15.9)
IMM GRANULOCYTES # BLD: 0 X10E3/UL (ref 0–0.1)
IMM GRANULOCYTES NFR BLD: 0 %
LYMPHOCYTES # BLD AUTO: 2 X10E3/UL (ref 0.7–3.1)
LYMPHOCYTES NFR BLD AUTO: 28 %
MCH RBC QN AUTO: 29.7 PG (ref 26.6–33)
MCHC RBC AUTO-ENTMCNC: 32.3 G/DL (ref 31.5–35.7)
MCV RBC AUTO: 92 FL (ref 79–97)
MONOCYTES # BLD AUTO: 0.5 X10E3/UL (ref 0.1–0.9)
MONOCYTES NFR BLD AUTO: 7 %
NEUTROPHILS # BLD AUTO: 4.3 X10E3/UL (ref 1.4–7)
NEUTROPHILS NFR BLD AUTO: 61 %
PLATELET # BLD AUTO: 413 X10E3/UL (ref 150–379)
POTASSIUM SERPL-SCNC: 4.9 MMOL/L (ref 3.5–5.2)
PROT SERPL-MCNC: 7.6 G/DL (ref 6–8.5)
RBC # BLD AUTO: 4.62 X10E6/UL (ref 3.77–5.28)
SODIUM SERPL-SCNC: 139 MMOL/L (ref 134–144)
T4 FREE SERPL-MCNC: 1.09 NG/DL (ref 0.82–1.77)
TSH SERPL DL<=0.005 MIU/L-ACNC: 2.31 UIU/ML (ref 0.45–4.5)
WBC # BLD AUTO: 7.1 X10E3/UL (ref 3.4–10.8)

## 2018-08-08 NOTE — PROGRESS NOTES
Please advise normal thyroid function, normal electrolytes including kidney and liver function. CBC with no anemia, mildly elevated platelet count. With the absence of other symptoms and with benign examination recommend repeating CBC in 4 weeks.

## 2018-08-09 ENCOUNTER — TELEPHONE (OUTPATIENT)
Dept: FAMILY MEDICINE CLINIC | Age: 53
End: 2018-08-09

## 2018-08-09 NOTE — TELEPHONE ENCOUNTER
----- Message from Davonte Sahu MD sent at 8/8/2018  3:59 PM EDT -----  Please advise normal thyroid function, normal electrolytes including kidney and liver function. CBC with no anemia, mildly elevated platelet count. With the absence of other symptoms and with benign examination recommend repeating CBC in 4 weeks.

## 2018-08-14 ENCOUNTER — OFFICE VISIT (OUTPATIENT)
Dept: FAMILY MEDICINE CLINIC | Age: 53
End: 2018-08-14

## 2018-08-14 VITALS
SYSTOLIC BLOOD PRESSURE: 124 MMHG | WEIGHT: 211 LBS | BODY MASS INDEX: 30.21 KG/M2 | OXYGEN SATURATION: 98 % | TEMPERATURE: 97.7 F | HEART RATE: 87 BPM | HEIGHT: 70 IN | RESPIRATION RATE: 18 BRPM | DIASTOLIC BLOOD PRESSURE: 82 MMHG

## 2018-08-14 DIAGNOSIS — B02.9 HERPES ZOSTER WITHOUT COMPLICATION: Primary | ICD-10-CM

## 2018-08-14 DIAGNOSIS — R79.89 ELEVATED PLATELET COUNT: ICD-10-CM

## 2018-08-14 DIAGNOSIS — E66.9 OBESITY (BMI 30-39.9): ICD-10-CM

## 2018-08-14 RX ORDER — VALACYCLOVIR HYDROCHLORIDE 1 G/1
1 TABLET, FILM COATED ORAL 2 TIMES DAILY
COMMUNITY
Start: 2018-08-11 | End: 2019-03-16

## 2018-08-14 RX ORDER — LIDOCAINE 50 MG/G
PATCH TOPICAL
Qty: 15 EACH | Refills: 0 | Status: SHIPPED | OUTPATIENT
Start: 2018-08-14 | End: 2019-03-16

## 2018-08-14 NOTE — PROGRESS NOTES
Subjective:      Haroon Oneil is a 48 y.o. female here with complaint of shingles. Located on the right neck, shoulder and small spot of the right anterior chest. Initially started with pain and then rash appeared. This is her first episode. Evaluated at local urgent care center and is being treated with Valtrex 1 gram tablet for 10 days. For pain she is taking ibuprofen 400 mg every 4 hours. 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 Wonders when she will be able to return to work. Current Outpatient Prescriptions   Medication Sig Dispense Refill    valACYclovir (VALTREX) 1 gram tablet Take 1 mg by mouth two (2) times a day.  escitalopram oxalate (LEXAPRO) 10 mg tablet TAKE ONE TABLET BY MOUTH ONCE OVER 24 HOURS 90 Tab 3    multivitamin (ONE A DAY) tablet Take 1 Tab by mouth daily.  aspirin (ASPIRIN) 325 mg tablet Take 325 mg by mouth daily.  omega-3 fatty acids-vitamin e 1,000 mg cap Take 1 Cap by mouth.  calcium-cholecalciferol, D3, (CALTRATE 600+D) tablet Take 1 Tab by mouth daily. No Known Allergies      Past Medical History:   Diagnosis Date    Depression     after   2012       Social History   Substance Use Topics    Smoking status: Former Smoker    Smokeless tobacco: Never Used    Alcohol use No        Review of Systems  Pertinent items are noted in HPI. Objective:     Visit Vitals    /82 (BP 1 Location: Right arm, BP Patient Position: Sitting)  Comment: Manual    Pulse 87    Temp 97.7 °F (36.5 °C) (Oral)  Comment: .     Resp 18    Ht 5' 10\" (1.778 m)    Wt 211 lb (95.7 kg)    SpO2 98%    BMI 30.28 kg/m2      General appearance - alert, well appearing, and in no distress  Ears - right TM and external canal normal   Chest - clear to auscultation, no wheezes, rales or rhonchi, symmetric air entry, no tachypnea, retractions or cyanosis  Heart - normal rate, regular rhythm, normal S1, S2, no murmurs, rubs, clicks or gallops  Skin - erythematous vesicular rash noted on the right neck, right anterior shoulder, and right anterior chest without excoriation or drainage     Assessment/Plan:   Ruben Odonnell is a 48 y.o. female seen for:     1. Herpes zoster without complication  - lidocaine (LIDODERM) 5 %; Apply patch to the affected area for 12 hours a day and remove for 12 hours a day. Dispense: 15 Each; Refill: 0  - continue with course of Valtrex as prescribed, ibuprofen 400-800 mg every 6-8 hours as needed for pain   - keep area clean and dry, keep covered as needed  - work note provided     2. Obesity (BMI 30-39. 9): I have reviewed/discussed the above normal BMI with the patient. I have recommended the following interventions: dietary management education, guidance, and counseling and encourage exercise. I have discussed the diagnosis with the patient and the intended plan as seen in the above orders. The patient has received an after-visit summary and questions were answered concerning future plans. I have discussed medication side effects and warnings with the patient as well. Patient verbalizes understanding of plan of care and denies further questions or concerns at this time. Informed patient to return to the office if symptoms worsen or if new symptoms arise. Follow-up Disposition:  Return if symptoms worsen or fail to improve.

## 2018-08-14 NOTE — PATIENT INSTRUCTIONS

## 2018-08-14 NOTE — PROGRESS NOTES
Identified pt with two pt identifiers(name and ). Chief Complaint   Patient presents with    Shingles     Began friday. Diagnosed in urgent care Saturday        Health Maintenance Due   Topic    Hepatitis C Screening     DTaP/Tdap/Td series (1 - Tdap)    PAP AKA CERVICAL CYTOLOGY     BREAST CANCER SCRN MAMMOGRAM     FOBT Q 1 YEAR AGE 50-75     Influenza Age 5 to Adult        Wt Readings from Last 3 Encounters:   18 211 lb (95.7 kg)   18 211 lb (95.7 kg)   17 191 lb (86.6 kg)     Temp Readings from Last 3 Encounters:   18 97.7 °F (36.5 °C) (Oral)   18 98 °F (36.7 °C) (Oral)   17 97.7 °F (36.5 °C) (Oral)     BP Readings from Last 3 Encounters:   18 124/82   18 118/68   17 130/80     Pulse Readings from Last 3 Encounters:   18 87   18 62   17 72         Learning Assessment:  :     Learning Assessment 2016   PRIMARY LEARNER Patient   HIGHEST LEVEL OF EDUCATION - PRIMARY LEARNER  DID NOT GRADUATE HIGH SCHOOL   BARRIERS PRIMARY LEARNER NONE   CO-LEARNER CAREGIVER No   PRIMARY LANGUAGE ENGLISH   LEARNER PREFERENCE PRIMARY OTHER (COMMENT)   ANSWERED BY self   RELATIONSHIP SELF       Depression Screening:  :     PHQ over the last two weeks 2018   PHQ Not Done -   Little interest or pleasure in doing things Not at all   Feeling down, depressed, irritable, or hopeless Not at all   Total Score PHQ 2 0       Fall Risk Assessment:  :     Fall Risk Assessment, last 12 mths 2016   Able to walk? Yes   Fall in past 12 months? No       Abuse Screening:  :     Abuse Screening Questionnaire 2018   Do you ever feel afraid of your partner? N N   Are you in a relationship with someone who physically or mentally threatens you? N N   Is it safe for you to go home? Y Y       Coordination of Care Questionnaire:  :     1) Have you been to an emergency room, urgent care clinic since your last visit?  yes [facility] for shingles  Hospitalized since your last visit? no             2) Have you seen or consulted any other health care providers outside of [address] [place] [address] since your last visit? no  (Include any pap smears or colon screenings in this section.)    3) Do you have an Advance Directive on file? no  Are you interested in receiving information about Advance Directives? no    Reviewed record in preparation for visit and have obtained necessary documentation. Medication reconciliation up to date and corrected with patient at this time.

## 2018-08-14 NOTE — LETTER
NOTIFICATION RETURN TO WORK / SCHOOL 
 
8/14/2018 8:29 AM 
 
Ms. Shaunna Ibrahim [address] [place], [place] [phone] To Whom It May Concern: 
 
Shaunna Ibrahim is currently under the care of [address] [place] [address]. 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 If there are questions or concerns please have the patient contact our office. Sincerely, Akbar Dubois MD

## 2018-08-14 NOTE — MR AVS SNAPSHOT
[facility] 
 
 
 [facility] [address] [facility] 
[phone] Patient: Laura Howell MRN: [id number] Visit Information Date & Time Provider Department Dept. Phone Encounter #  
 8/14/2018  8:00 AM [facility] [phone] [id number] Follow-up Instructions Return if symptoms worsen or fail to improve. Upcoming Health Maintenance Date Due Hepatitis C Screening 1965 DTaP/Tdap/Td series (1 - Tdap) 4/20/1986 PAP AKA CERVICAL CYTOLOGY 4/20/1986 BREAST CANCER SCRN MAMMOGRAM 4/20/2015 FOBT Q 1 YEAR AGE 50-75 4/20/2015 Influenza Age 5 to Adult 8/1/2018 Allergies as of 8/14/2018  Review Complete On: 8/14/2018 By: Davonte Sahu MD  
 No Known Allergies Current Immunizations  Reviewed on 8/14/2018 No immunizations on file. Reviewed by Davonte Sahu MD on 8/14/2018 at  8:11 AM  
You Were Diagnosed With   
  
 Codes Comments Herpes zoster without complication    -  Primary ICD-10-CM: B02.9 ICD-9-CM: 053.9 Obesity (BMI 30-39. 9)     ICD-10-CM: E66.9 ICD-9-CM: 278.00 Vitals BP Pulse Temp Resp Height(growth percentile) Weight(growth percentile) 124/82 (BP 1 Location: Right arm, BP Patient Position: Sitting) 87 97.7 °F (36.5 °C) (Oral) 18 5' 10\" (1.778 m) 211 lb (95.7 kg) SpO2 BMI OB Status Smoking Status 98% 30.28 kg/m2 Menopause Former Smoker Vitals History BMI and BSA Data Body Mass Index Body Surface Area  
 30.28 kg/m 2 2.17 m 2 Preferred Pharmacy Pharmacy Name Phone [facility] [address] [phone] Your Updated Medication List  
  
   
This list is accurate as of 8/14/18  8:29 AM.  Always use your most recent med list.  
  
  
  
  
 aspirin 325 mg tablet Commonly known as:  ASPIRIN Take 325 mg by mouth daily. calcium-cholecalciferol (D3) tablet Commonly known as:  CALTRATE 600+D Take 1 Tab by mouth daily. escitalopram oxalate 10 mg tablet Commonly known as:  Cody Setters TAKE ONE TABLET BY MOUTH ONCE OVER 24 HOURS  
  
 lidocaine 5 % Commonly known as:  Jihan Abrams Apply patch to the affected area for 12 hours a day and remove for 12 hours a day. multivitamin tablet Commonly known as:  ONE A DAY Take 1 Tab by mouth daily. omega-3 fatty acids-vitamin e 1,000 mg Cap Take 1 Cap by mouth. valACYclovir 1 gram tablet Commonly known as:  VALTREX Take 1 mg by mouth two (2) times a day. Prescriptions Sent to Pharmacy Refills  
 lidocaine (LIDODERM) 5 % 0 Sig: Apply patch to the affected area for 12 hours a day and remove for 12 hours a day. Class: Normal  
 Pharmacy: [facility] [address] [place] [address] #: [phone] Follow-up Instructions Return if symptoms worsen or fail to improve. Patient Instructions Shingles: Care Instructions Your Care Instructions Shingles (herpes zoster) causes pain and a blistered rash. The rash can appear anywhere on the body but will be on only one side of the body, the left or right. It will be in a band, a strip, or a small area. The pain can be very severe. Shingles can also cause tingling or itching in the area of the rash. The blisters scab over after a few days and heal in 2 to 4 weeks. Medicines can help you feel better and may help prevent more serious problems caused by shingles. Shingles is caused by the same virus that causes chickenpox. When you have chickenpox, the virus gets into your nerve roots and stays there (becomes dormant) long after you get over the chickenpox. If the virus becomes active again, it can cause shingles. Follow-up care is a key part of your treatment and safety.  Be sure to make and go to all appointments, and call your doctor if you are having problems. It's also a good idea to know your test results and keep a list of the medicines you take. How can you care for yourself at home? · Be safe with medicines. Take your medicines exactly as prescribed. Call your doctor if you think you are having a problem with your medicine. Antiviral medicine helps you get better faster. · Try not to scratch or pick at the blisters. They will crust over and fall off on their own if you leave them alone. · Put cool, wet cloths on the area to relieve pain and itching. You can also use calamine lotion. Try not to use so much lotion that it cakes and is hard to get off. · Put cornstarch or baking soda on the sores to help dry them out so they heal faster. · Do not use thick ointment, such as petroleum jelly, on the sores. This will keep them from drying and healing. · To help remove loose crusts, soak them in tap water. This can help decrease oozing, and dry and soothe the skin. · Take an over-the-counter pain medicine, such as acetaminophen (Tylenol), ibuprofen (Advil, Motrin), or naproxen (Aleve). Read and follow all instructions on the label. · Avoid close contact with people until the blisters have healed. It is very important for you to avoid contact with anyone who has never had chickenpox or the chickenpox vaccine. Pregnant women, young babies, and anyone else who has a hard time fighting infection (such as someone with HIV, diabetes, or cancer) is especially at risk. When should you call for help? Call your doctor now or seek immediate medical care if: 
  · You have a new or higher fever.  
  · You have a severe headache and a stiff neck.  
  · You lose the ability to think clearly.  
  · The rash spreads to your forehead, nose, eyes, or eyelids.  
  · You have eye pain, or your vision gets worse.  
  · You have new pain in your face, or you cannot move the muscles in your face.  
  · Blisters spread to new parts of your body.  Watch closely for changes in your health, and be sure to contact your doctor if: 
  · The rash has not healed after 2 to 4 weeks.  
  · You still have pain after the rash has healed. Where can you learn more? Go to http://tsu-valentina.info/. Tamir Mercado in the search box to learn more about \"Shingles: Care Instructions. \" Current as of: November 18, 2017 Content Version: 11.7 © 7399-7421 [employer]. Care instructions adapted under license by [employer] (which disclaims liability or warranty for this information). If you have questions about a medical condition or this instruction, always ask your healthcare professional. [address] any warranty or liability for your use of this information. Introducing [facility]! [employer] introduces [employer] patient portal. Now you can access parts of your medical record, email your doctor's office, and request medication refills online. 1. In your internet browser, go to [email or web address] 2. Click on the First Time User? Click Here link in the Sign In box. You will see the New Member Sign Up page. 3. Enter your Stukent Access Code exactly as it appears below. You will not need to use this code after youve completed the sign-up process. If you do not sign up before the expiration date, you must request a new code. · [employer]ukent Access Code: [email or web address] Expires: 11/4/2018 11:06 AM 
 
4. Enter the last four digits of your Social Security Number (xxxx) and Date of Birth (mm/dd/yyyy) as indicated and click Submit. You will be taken to the next sign-up page. 5. Create a [employer] ID. This will be your Stukent login ID and cannot be changed, so think of one that is secure and easy to remember. 6. Create a Stukent password. You can change your password at any time. 7. Enter your Password Reset Question and Answer.  This can be used at a later time if you forget your password. 8. Enter your e-mail address. You will receive e-mail notification when new information is available in [address] 9. Click Sign Up. You can now view and download portions of your medical record. 10. Click the Download Summary menu link to download a portable copy of your medical information. If you have questions, please visit the Frequently Asked Questions section of the [employer] website. Remember, [employer] is NOT to be used for urgent needs. For medical emergencies, dial 911. Now available from your iPhone and Android! Please provide this summary of care documentation to your next provider. Your primary care clinician is listed as [address]. If you have any questions after today's visit, please call [phone].

## 2018-11-02 DIAGNOSIS — F41.9 ANXIETY: ICD-10-CM

## 2018-11-02 DIAGNOSIS — F41.0 PANIC DISORDER: ICD-10-CM

## 2018-11-02 RX ORDER — ESCITALOPRAM OXALATE 10 MG/1
TABLET ORAL
Qty: 90 TAB | Refills: 3 | Status: SHIPPED | OUTPATIENT
Start: 2018-11-02

## 2019-03-16 ENCOUNTER — HOSPITAL ENCOUNTER (EMERGENCY)
Age: 54
Discharge: HOME | End: 2019-03-16
Attending: EMERGENCY MEDICINE
Payer: COMMERCIAL

## 2019-03-16 VITALS
OXYGEN SATURATION: 94 % | SYSTOLIC BLOOD PRESSURE: 126 MMHG | TEMPERATURE: 97.8 F | RESPIRATION RATE: 17 BRPM | WEIGHT: 209.44 LBS | DIASTOLIC BLOOD PRESSURE: 68 MMHG | HEIGHT: 70 IN | HEART RATE: 69 BPM | BODY MASS INDEX: 29.98 KG/M2

## 2019-03-16 DIAGNOSIS — R10.13 ABDOMINAL PAIN, EPIGASTRIC: Primary | ICD-10-CM

## 2019-03-16 LAB
ALBUMIN SERPL-MCNC: 3.8 G/DL (ref 3.5–5)
ALBUMIN/GLOB SERPL: 1 {RATIO} (ref 1.1–2.2)
ALP SERPL-CCNC: 98 U/L (ref 45–117)
ALT SERPL-CCNC: 49 U/L (ref 12–78)
ANION GAP SERPL CALC-SCNC: 13 MMOL/L (ref 5–15)
AST SERPL-CCNC: 65 U/L (ref 15–37)
BASOPHILS # BLD: 0 K/UL (ref 0–0.1)
BASOPHILS NFR BLD: 0 % (ref 0–1)
BILIRUB SERPL-MCNC: 0.2 MG/DL (ref 0.2–1)
BUN SERPL-MCNC: 17 MG/DL (ref 6–20)
BUN/CREAT SERPL: 25 (ref 12–20)
CALCIUM SERPL-MCNC: 9.2 MG/DL (ref 8.5–10.1)
CHLORIDE SERPL-SCNC: 105 MMOL/L (ref 97–108)
CO2 SERPL-SCNC: 26 MMOL/L (ref 21–32)
COMMENT, HOLDF: NORMAL
CREAT SERPL-MCNC: 0.68 MG/DL (ref 0.55–1.02)
D DIMER PPP FEU-MCNC: 0.42 MG/L FEU (ref 0–0.65)
DIFFERENTIAL METHOD BLD: NORMAL
EOSINOPHIL # BLD: 0.3 K/UL (ref 0–0.4)
EOSINOPHIL NFR BLD: 3 % (ref 0–7)
ERYTHROCYTE [DISTWIDTH] IN BLOOD BY AUTOMATED COUNT: 12.7 % (ref 11.5–14.5)
GLOBULIN SER CALC-MCNC: 4 G/DL (ref 2–4)
GLUCOSE SERPL-MCNC: 127 MG/DL (ref 65–100)
HCT VFR BLD AUTO: 40.4 % (ref 35–47)
HGB BLD-MCNC: 13.5 G/DL (ref 11.5–16)
LIPASE SERPL-CCNC: 388 U/L (ref 73–393)
LYMPHOCYTES # BLD: 3 K/UL (ref 0.8–3.5)
LYMPHOCYTES NFR BLD: 38 % (ref 12–49)
MCH RBC QN AUTO: 30.8 PG (ref 26–34)
MCHC RBC AUTO-ENTMCNC: 33.4 G/DL (ref 30–36.5)
MCV RBC AUTO: 92.2 FL (ref 80–99)
MONOCYTES # BLD: 0.6 K/UL (ref 0–1)
MONOCYTES NFR BLD: 7 % (ref 5–13)
NEUTS SEG # BLD: 4.1 K/UL (ref 1.8–8)
NEUTS SEG NFR BLD: 52 % (ref 32–75)
PLATELET # BLD AUTO: 370 K/UL (ref 150–400)
PMV BLD AUTO: 10 FL (ref 8.9–12.9)
POTASSIUM SERPL-SCNC: 3.6 MMOL/L (ref 3.5–5.1)
PROT SERPL-MCNC: 7.8 G/DL (ref 6.4–8.2)
RBC # BLD AUTO: 4.38 M/UL (ref 3.8–5.2)
SAMPLES BEING HELD,HOLD: NORMAL
SODIUM SERPL-SCNC: 144 MMOL/L (ref 136–145)
TROPONIN I SERPL-MCNC: <0.05 NG/ML
WBC # BLD AUTO: 7.9 K/UL (ref 3.6–11)

## 2019-03-16 PROCEDURE — 80053 COMPREHEN METABOLIC PANEL: CPT

## 2019-03-16 PROCEDURE — 84484 ASSAY OF TROPONIN QUANT: CPT

## 2019-03-16 PROCEDURE — 93005 ELECTROCARDIOGRAM TRACING: CPT

## 2019-03-16 PROCEDURE — 85379 FIBRIN DEGRADATION QUANT: CPT

## 2019-03-16 PROCEDURE — 74011250637 HC RX REV CODE- 250/637: Performed by: EMERGENCY MEDICINE

## 2019-03-16 PROCEDURE — 74011000250 HC RX REV CODE- 250: Performed by: EMERGENCY MEDICINE

## 2019-03-16 PROCEDURE — 83690 ASSAY OF LIPASE: CPT

## 2019-03-16 PROCEDURE — 36415 COLL VENOUS BLD VENIPUNCTURE: CPT

## 2019-03-16 PROCEDURE — 99285 EMERGENCY DEPT VISIT HI MDM: CPT

## 2019-03-16 PROCEDURE — 85025 COMPLETE CBC W/AUTO DIFF WBC: CPT

## 2019-03-16 RX ORDER — GUAIFENESIN 100 MG/5ML
325 LIQUID (ML) ORAL
Status: COMPLETED | OUTPATIENT
Start: 2019-03-16 | End: 2019-03-16

## 2019-03-16 RX ORDER — MAG HYDROX/ALUMINUM HYD/SIMETH 200-200-20
SUSPENSION, ORAL (FINAL DOSE FORM) ORAL
Status: DISPENSED
Start: 2019-03-16 | End: 2019-03-16

## 2019-03-16 RX ORDER — LIDOCAINE HYDROCHLORIDE 20 MG/ML
SOLUTION OROPHARYNGEAL
Status: DISPENSED
Start: 2019-03-16 | End: 2019-03-16

## 2019-03-16 RX ORDER — ASPIRIN 325 MG
TABLET ORAL
Status: DISPENSED
Start: 2019-03-16 | End: 2019-03-16

## 2019-03-16 RX ORDER — GUAIFENESIN 100 MG/5ML
LIQUID (ML) ORAL
Status: DISPENSED
Start: 2019-03-16 | End: 2019-03-16

## 2019-03-16 RX ADMIN — LIDOCAINE HYDROCHLORIDE 40 ML: 20 SOLUTION ORAL; TOPICAL at 01:36

## 2019-03-16 RX ADMIN — ASPIRIN 81 MG 324 MG: 81 TABLET ORAL at 01:40

## 2019-03-16 NOTE — ED TRIAGE NOTES
Pt ambulatory to treatment area with c/o \"chest pain and sob that started suddenly about an hour ago. I was just trying to go to sleep. \"  Pt also reports some nausea.

## 2019-03-16 NOTE — ED PROVIDER NOTES
'ate dinner late tonight/ 10 pm/ ate waffles and M and M's/ went to bed/ was fine than about 1 hour ago developed severe upper abd pain/ I had to get up/ had a good BM/ never had pain like this in my life before'; pain improved after BM/ still present/ Pt adds 'was very shakey' during the initial pain;   pt denies HA, vison changes, diff swallowing, CP, SOB, F/Ch, N/V, D/Cons or other current systemic complaints        Abdominal Pain    The current episode started 1 to 2 hours ago. The problem has been gradually improving. The pain is associated with an unknown factor. The pain is located in the RUQ, LUQ and epigastric region. The quality of the pain is aching, dull and burning. The pain is mild. Pertinent negatives include no anorexia, no fever, no belching, no diarrhea, no flatus, no hematochezia, no melena, no nausea, no vomiting, no constipation, no dysuria, no frequency, no hematuria, no headaches, no arthralgias, no myalgias, no trauma, no chest pain and no back pain. Nothing worsens the pain. The pain is relieved by nothing. Past workup comments: denies prior episodes.  Past medical history comments: anxiety, .        Past Medical History:   Diagnosis Date    Depression     after   2012       Past Surgical History:   Procedure Laterality Date    HX  SECTION  34 yrs ago    Rboerson HX  SECTION  32 years ago   Roberson HX GYN           Family History:   Family history unknown: Yes       Social History     Socioeconomic History    Marital status:      Spouse name: Not on file    Number of children: Not on file    Years of education: Not on file    Highest education level: Not on file   Social Needs    Financial resource strain: Not on file    Food insecurity - worry: Not on file    Food insecurity - inability: Not on file   [email or web address] needs - medical: Not on file   [employer] needs - non-medical: Not on file   Occupational History    Not on file   Tobacco Use  Smoking status: Former Smoker    Smokeless tobacco: Never Used   Substance and Sexual Activity    Alcohol use: No     Alcohol/week: 0.0 oz    Drug use: No    Sexual activity: Not Currently   Other Topics Concern    Not on file   Social History Narrative    Not on file         ALLERGIES: Patient has no known allergies. Review of Systems   Constitutional: Negative for chills and fever. HENT: Negative for trouble swallowing and voice change. Eyes: Negative for photophobia and visual disturbance. Respiratory: Negative for chest tightness and shortness of breath. Cardiovascular: Negative for chest pain. Gastrointestinal: Positive for abdominal pain. Negative for anorexia, constipation, diarrhea, flatus, hematochezia, melena, nausea and vomiting. Genitourinary: Negative for dysuria, frequency, hematuria, vaginal bleeding and vaginal discharge. Musculoskeletal: Negative for arthralgias, back pain and myalgias. Neurological: Positive for tremors. Negative for dizziness, syncope, speech difficulty, weakness and headaches. All other systems reviewed and are negative. There were no vitals filed for this visit. Physical Exam   Constitutional: She is oriented to person, place, and time. She appears well-developed and well-nourished. Anxious,  AxOx4, speaking in complete sentences     HENT:   Head: Normocephalic and atraumatic. Cn intact       Eyes: Conjunctivae are normal. Pupils are equal, round, and reactive to light. Right eye exhibits no discharge. No scleral icterus. Neck: Normal range of motion. Neck supple. No JVD present. No tracheal deviation present. Cardiovascular: Normal rate, regular rhythm, normal heart sounds and intact distal pulses. Exam reveals no gallop and no friction rub. No murmur heard. Pulmonary/Chest: Effort normal and breath sounds normal. No respiratory distress. She has no wheezes. She has no rales. She exhibits no tenderness. Abdominal: Soft. Bowel sounds are normal. There is no tenderness. There is no rebound and no guarding. nttp     Genitourinary: No vaginal discharge found. Genitourinary Comments: Pt denies urinary/ vaginal complaints   Musculoskeletal: Normal range of motion. She exhibits no edema or tenderness. Neurological: She is alert and oriented to person, place, and time. She displays normal reflexes. No cranial nerve deficit or sensory deficit. She exhibits normal muscle tone. Coordination normal.   pt has motor/ CV/ Sensation grossly intact to all extremities, R = L in strength;   Skin: Skin is warm and dry. Capillary refill takes less than 2 seconds. No rash noted. No erythema. No pallor. Psychiatric: She has a normal mood and affect. Her behavior is normal. Thought content normal.   Nursing note and vitals reviewed. MDM       Procedures    No chief complaint on file.      1:09 AM  The patients presenting problems have been discussed, and they are in agreement with the care plan formulated and outlined with them. I have encouraged them to ask questions as they arise throughout their visit. MEDICATIONS GIVEN:  Medications - No data to display    LABS REVIEWED:  Labs Reviewed - No data to display    RADIOLOGY RESULTS:  The following have been ordered and reviewed:  _____________________________________________________________________  _____________________________________________________________________    EKG interpretation:   Rhythm: normal sinus rhythm; and regular . Rate (approx.): 80; Axis: normal; P wave: normal; QRS interval: normal ; ST/T wave: normal; Negative acute significant segmental elevations/ no old study for comparison    PROCEDURES:        CONSULTATIONS:       PROGRESS NOTES:      DIAGNOSIS:    1. Abdominal pain, epigastric        PLAN:  1-abd pain/ Chest Pain / neg ed evaluation - will have pt follow-up with Cardiology;       ED COURSE: The patients hospital course has been uncomplicated.     2:22 AM  'doing better now'; awaiting repeat at 0300;        3:09 AM  Tee Kurtz  results have been reviewed with her. She has been counseled regarding her diagnosis. She verbally conveys understanding and agreement of the signs, symptoms, diagnosis, treatment and prognosis and additionally agrees to Call/ Arrange follow up as recommended with Dr. Edwina Alvarado MD in 24 - 48 hours. She also agrees with the care-plan and conveys that all of her questions have been answered. I have also put together some discharge instructions for her that include: 1) educational information regarding their diagnosis, 2) how to care for their diagnosis at home, as well a 3) list of reasons why they would want to return to the ED prior to their follow-up appointment, should their condition change or for concerns.

## 2019-03-16 NOTE — DISCHARGE INSTRUCTIONS
Patient Education        Abdominal Pain: Care Instructions  Your Care Instructions    Abdominal pain has many possible causes. Some aren't serious and get better on their own in a few days. Others need more testing and treatment. If your pain continues or gets worse, you need to be rechecked and may need more tests to find out what is wrong. You may need surgery to correct the problem. Don't ignore new symptoms, such as fever, nausea and vomiting, urination problems, pain that gets worse, and dizziness. These may be signs of a more serious problem. Your doctor may have recommended a follow-up visit in the next 8 to 12 hours. If you are not getting better, you may need more tests or treatment. The doctor has checked you carefully, but problems can develop later. If you notice any problems or new symptoms, get medical treatment right away. Follow-up care is a key part of your treatment and safety. Be sure to make and go to all appointments, and call your doctor if you are having problems. It's also a good idea to know your test results and keep a list of the medicines you take. How can you care for yourself at home? · Rest until you feel better. · To prevent dehydration, drink plenty of fluids, enough so that your urine is light yellow or clear like water. Choose water and other caffeine-free clear liquids until you feel better. If you have kidney, heart, or liver disease and have to limit fluids, talk with your doctor before you increase the amount of fluids you drink. · If your stomach is upset, eat mild foods, such as rice, dry toast or crackers, bananas, and applesauce. Try eating several small meals instead of two or three large ones. · Wait until 48 hours after all symptoms have gone away before you have spicy foods, alcohol, and drinks that contain caffeine. · Do not eat foods that are high in fat. · Avoid anti-inflammatory medicines such as aspirin, ibuprofen (Advil, Motrin), and naproxen (Aleve). These can cause stomach upset. Talk to your doctor if you take daily aspirin for another health problem. When should you call for help? Call 911 anytime you think you may need emergency care. For example, call if:    · You passed out (lost consciousness).     · You pass maroon or very bloody stools.     · You vomit blood or what looks like coffee grounds.     · You have new, severe belly pain.    Call your doctor now or seek immediate medical care if:    · Your pain gets worse, especially if it becomes focused in one area of your belly.     · You have a new or higher fever.     · Your stools are black and look like tar, or they have streaks of blood.     · You have unexpected vaginal bleeding.     · You have symptoms of a urinary tract infection. These may include:  ? Pain when you urinate. ? Urinating more often than usual.  ? Blood in your urine.     · You are dizzy or lightheaded, or you feel like you may faint.    Watch closely for changes in your health, and be sure to contact your doctor if:    · You are not getting better after 1 day (24 hours). Where can you learn more? Go to http://stu-valentina.info/. Enter D506 in the search box to learn more about \"Abdominal Pain: Care Instructions. \"  Current as of: September 23, 2018  Content Version: 11.9  © 9130-7280 [employer]. Care instructions adapted under license by [employer] (which disclaims liability or warranty for this information). If you have questions about a medical condition or this instruction, always ask your healthcare professional. [place] [address] any warranty or liability for your use of this information. Patient Education        Indigestion (Dyspepsia or Heartburn): Care Instructions  Your Care Instructions  Sometimes it can be hard to pinpoint the cause of indigestion.  (It is also called dyspepsia or heartburn.) Most cases of an upset stomach with bloating, burning, burping, and nausea are minor and go away within several hours. Home treatment and over-the-counter medicine often are able to control symptoms. But if you take medicine to relieve your indigestion without making diet and lifestyle changes, your symptoms are likely to return again and again. If you get indigestion often, it may be a sign of a more serious medical problem. Be sure to follow up with your doctor, who may want to do tests to be sure of the cause of your indigestion. Follow-up care is a key part of your treatment and safety. Be sure to make and go to all appointments, and call your doctor if you are having problems. It's also a good idea to know your test results and keep a list of the medicines you take. How can you care for yourself at home? · Your doctor may recommend over-the-counter medicine. For mild or occasional indigestion, antacids such as Gaviscon, Mylanta, Maalox, or Tums, may help. Be safe with medicines. Be careful when you take over-the-counter antacid medicines. Many of these medicines have aspirin in them. Read the label to make sure that you are not taking more than the recommended dose. Too much aspirin can be harmful. · Your doctor also may recommend over-the-counter acid reducers, such as Pepcid AC, Tagamet HB, Zantac 75, or Prilosec. Read and follow all instructions on the label. If you use these medicines often, talk with your doctor. · Change your eating habits. ? It's best to eat several small meals instead of two or three large meals. ? After you eat, wait 2 to 3 hours before you lie down. ? Chocolate, mint, and alcohol can make GERD worse. ? Spicy foods, foods that have a lot of acid (like tomatoes and oranges), and coffee can make GERD symptoms worse in some people. If your symptoms are worse after you eat a certain food, you may want to stop eating that food to see if your symptoms get better. · Do not smoke or chew tobacco. Smoking can make GERD worse.  If you need help quitting, talk to your doctor about stop-smoking programs and medicines. These can increase your chances of quitting for good. · If you have GERD symptoms at night, raise the head of your bed 6 to 8 inches. You can do this by putting the frame on blocks or placing a foam wedge under the head of your mattress. (Adding extra pillows does not work.)  · Do not wear tight clothing around your middle. · Lose weight if you need to. Losing just 5 to 10 pounds can help. · Do not take anti-inflammatory medicines, such as aspirin, ibuprofen (Advil, Motrin), or naproxen (Aleve). These can irritate the stomach. If you need a pain medicine, try acetaminophen (Tylenol), which does not cause stomach upset. When should you call for help? Call your doctor now or seek immediate medical care if:    · You have new or worse belly pain.     · You are vomiting.    Watch closely for changes in your health, and be sure to contact your doctor if:    · You have new or worse symptoms of indigestion.     · You have trouble or pain swallowing.     · You are losing weight.     · You do not get better as expected. Where can you learn more? Go to http://stu-valentina.info/. Enter Z558 in the search box to learn more about \"Indigestion (Dyspepsia or Heartburn): Care Instructions. \"  Current as of: March 27, 2018  Content Version: 11.9  © 6550-0014 [employer]. Care instructions adapted under license by [employer] (which disclaims liability or warranty for this information). If you have questions about a medical condition or this instruction, always ask your healthcare professional. [address] any warranty or liability for your use of this information.

## 2019-03-17 LAB
ATRIAL RATE: 80 BPM
CALCULATED P AXIS, ECG09: 35 DEGREES
CALCULATED R AXIS, ECG10: -33 DEGREES
CALCULATED T AXIS, ECG11: 38 DEGREES
DIAGNOSIS, 93000: NORMAL
P-R INTERVAL, ECG05: 160 MS
Q-T INTERVAL, ECG07: 412 MS
QRS DURATION, ECG06: 86 MS
QTC CALCULATION (BEZET), ECG08: 475 MS
TROPONIN I BLD-MCNC: <0.04 NG/ML (ref 0–0.08)
VENTRICULAR RATE, ECG03: 80 BPM

## 2019-03-18 ENCOUNTER — OFFICE VISIT (OUTPATIENT)
Dept: FAMILY MEDICINE CLINIC | Age: 54
End: 2019-03-18

## 2019-03-18 VITALS
OXYGEN SATURATION: 98 % | HEIGHT: 70 IN | DIASTOLIC BLOOD PRESSURE: 80 MMHG | HEART RATE: 74 BPM | SYSTOLIC BLOOD PRESSURE: 124 MMHG | WEIGHT: 202 LBS | TEMPERATURE: 97.8 F | BODY MASS INDEX: 28.92 KG/M2 | RESPIRATION RATE: 18 BRPM

## 2019-03-18 DIAGNOSIS — K29.00 ACUTE GASTRITIS WITHOUT HEMORRHAGE, UNSPECIFIED GASTRITIS TYPE: ICD-10-CM

## 2019-03-18 DIAGNOSIS — R10.13 EPIGASTRIC ABDOMINAL PAIN: Primary | ICD-10-CM

## 2019-03-18 RX ORDER — HYDROGEN PEROXIDE 3 %
20 SOLUTION, NON-ORAL MISCELLANEOUS
Qty: 14 CAP | Refills: 0 | Status: SHIPPED | OUTPATIENT
Start: 2019-03-18 | End: 2019-04-01

## 2019-03-18 NOTE — PROGRESS NOTES
Subjective:      Chantell Wei is a 48 y.o. female here for ED follow up. Evaluated [facility] 3/16/19 for epigastric abdominal pain. ED workup unremarkable Treated with GI cocktail with improvement in symptoms. Once she returned home she had onset of nausea, dry heaving, and headache. She has limited oral intake over the past 2 days, but has been able to tolerate cereal today. Denies diarrhea, hematemesis, melena, hematochezia. Does take 2-OTC ibuprofen in the evening due to her job. Notices symptoms are worse after eating and occur within 30 minutes of a meal.    Current Outpatient Medications   Medication Sig Dispense Refill    escitalopram oxalate (LEXAPRO) 10 mg tablet TAKE ONE TABLET BY MOUTH ONCE OVER 24 HOURS 90 Tab 3    aspirin (ASPIRIN) 325 mg tablet Take 325 mg by mouth daily. No Known Allergies      Past Medical History:   Diagnosis Date    Depression     after   2012    Panic disorder        Social History     Tobacco Use    Smoking status: Former Smoker    Smokeless tobacco: Never Used   Substance Use Topics    Alcohol use: No     Alcohol/week: 0.0 oz        Review of Systems  Pertinent items are noted in HPI. Objective:     Visit Vitals  /80 (BP 1 Location: Right arm, BP Patient Position: Sitting) Comment: .    Pulse 74   Temp 97.8 °F (36.6 °C) (Oral) Comment: Manual   Resp 18   Ht 5' 10\" (1.778 m)   Wt 202 lb (91.6 kg)   SpO2 98%   BMI 28.98 kg/m²      General appearance - alert, well appearing, and in no distress  Eyes - pupils equal and reactive, extraocular eye movements intact, sclera anicteric  Oropharyngx - mucous membranes moist, pharynx normal without lesions  Neck - supple, no significant adenopathy  Chest - clear to auscultation, no wheezes, rales or rhonchi, symmetric air entry, no tachypnea, retractions or cyanosis  Heart - normal rate, regular rhythm, normal S1, S2, no murmurs, rubs, clicks or gallops  Abdomen - soft, mild epigastric tenderness without rebound or guarding, nondistended, no masses or organomegaly, bowel sounds normal     Assessment/Plan:   Chantell Wei is a 48 y.o. female seen for:     1. Epigastric abdominal pain: recent labs and EKG unremarkable. History concerning for gastritis with no alarming symptoms present. Trial of PPI therapy as below. Discussed Gastroenterology evaluation if symptoms persist or worsen. Signs/symptoms which would warrant reevaluation discussed. - esomeprazole (NEXIUM 24HR) 20 mg capsule; Take 1 Cap by mouth Daily (before breakfast) for 14 days. Dispense: 14 Cap; Refill: 0    2. Acute gastritis without hemorrhage, unspecified gastritis type  - esomeprazole (NEXIUM 24HR) 20 mg capsule; Take 1 Cap by mouth Daily (before breakfast) for 14 days. Dispense: 14 Cap; Refill: 0    I have discussed the diagnosis with the patient and the intended plan as seen in the above orders. The patient has received an after-visit summary and questions were answered concerning future plans. I have discussed medication side effects and warnings with the patient as well. Patient verbalizes understanding of plan of care and denies further questions or concerns at this time. Informed patient to return to the office if symptoms worsen or if new symptoms arise. Follow-up Disposition:  Return if symptoms worsen or fail to improve.

## 2019-03-18 NOTE — PROGRESS NOTES
Identified pt with two pt identifiers(name and ). Chief Complaint   Patient presents with   [facility] Follow Up     Epigastric pain    Nausea        Health Maintenance Due   Topic    DTaP/Tdap/Td series (1 - Tdap)    PAP AKA CERVICAL CYTOLOGY     Shingrix Vaccine Age 50> (1 of 2)    BREAST CANCER SCRN MAMMOGRAM     FOBT Q 1 YEAR AGE 50-75     Influenza Age 5 to Adult        Wt Readings from Last 3 Encounters:   19 202 lb (91.6 kg)   19 209 lb 7 oz (95 kg)   18 211 lb (95.7 kg)     Temp Readings from Last 3 Encounters:   19 97.8 °F (36.6 °C) (Oral)   19 97.8 °F (36.6 °C)   18 97.7 °F (36.5 °C) (Oral)     BP Readings from Last 3 Encounters:   19 124/80   19 126/68   18 124/82     Pulse Readings from Last 3 Encounters:   19 74   19 69   18 87         Learning Assessment:  :     Learning Assessment 2016   PRIMARY LEARNER Patient   HIGHEST LEVEL OF EDUCATION - PRIMARY LEARNER  DID NOT GRADUATE HIGH SCHOOL   BARRIERS PRIMARY LEARNER NONE   CO-LEARNER CAREGIVER No   PRIMARY LANGUAGE ENGLISH   LEARNER PREFERENCE PRIMARY OTHER (COMMENT)   ANSWERED BY self   RELATIONSHIP SELF       Depression Screening:  :     3 most recent PHQ Screens 2018   PHQ Not Done -   Little interest or pleasure in doing things Not at all   Feeling down, depressed, irritable, or hopeless Not at all   Total Score PHQ 2 0       Fall Risk Assessment:  :     Fall Risk Assessment, last 12 mths 2016   Able to walk? Yes   Fall in past 12 months? No       Abuse Screening:  :     Abuse Screening Questionnaire 2018   Do you ever feel afraid of your partner? N N   Are you in a relationship with someone who physically or mentally threatens you? N N   Is it safe for you to go home?  Y Y       Coordination of Care Questionnaire:  :     1) Have you been to an emergency room, urgent care clinic since your last visit? no   Hospitalized since your last visit? no             2) Have you seen or consulted any other health care providers outside of [address] [place] [address] since your last visit? no  (Include any pap smears or colon screenings in this section.)    3) Do you have an Advance Directive on file? no  Are you interested in receiving information about Advance Directives? no    Reviewed record in preparation for visit and have obtained necessary documentation. Medication reconciliation up to date and corrected with patient at this time.

## 2019-03-18 NOTE — LETTER
NOTIFICATION RETURN TO WORK / SCHOOL 
 
3/18/2019 4:32 PM 
 
Ms. Levy Zhou [address] [place], [place] [id number] To Whom It May Concern: 
 
Levy Zhou is currently under the care of [address] [place] [address]. She will return to work/school on: 3/20/19. Please excuse her absence 3/18/19 and 3/19/19. If there are questions or concerns please have the patient contact our office. Sincerely, Ortiz Carmichael MD

## 2019-12-27 ENCOUNTER — HOSPITAL ENCOUNTER (EMERGENCY)
Age: 54
Discharge: HOME | End: 2019-12-27
Attending: EMERGENCY MEDICINE
Payer: COMMERCIAL

## 2019-12-27 ENCOUNTER — APPOINTMENT (OUTPATIENT)
Dept: ULTRASOUND IMAGING | Age: 54
End: 2019-12-27
Attending: EMERGENCY MEDICINE
Payer: COMMERCIAL

## 2019-12-27 VITALS
WEIGHT: 190 LBS | OXYGEN SATURATION: 96 % | HEART RATE: 75 BPM | HEIGHT: 70 IN | SYSTOLIC BLOOD PRESSURE: 148 MMHG | BODY MASS INDEX: 27.2 KG/M2 | DIASTOLIC BLOOD PRESSURE: 72 MMHG | TEMPERATURE: 98 F | RESPIRATION RATE: 16 BRPM

## 2019-12-27 DIAGNOSIS — K80.20 GALLSTONES: Primary | ICD-10-CM

## 2019-12-27 DIAGNOSIS — K52.9 GASTROENTERITIS: ICD-10-CM

## 2019-12-27 LAB
ALBUMIN SERPL-MCNC: 3.7 G/DL (ref 3.5–5)
ALBUMIN/GLOB SERPL: 0.9 {RATIO} (ref 1.1–2.2)
ALP SERPL-CCNC: 149 U/L (ref 45–117)
ALT SERPL-CCNC: 599 U/L (ref 12–78)
ANION GAP SERPL CALC-SCNC: 9 MMOL/L (ref 5–15)
APAP SERPL-MCNC: <2 UG/ML (ref 10–30)
AST SERPL-CCNC: 965 U/L (ref 15–37)
ATRIAL RATE: 79 BPM
BASOPHILS # BLD: 0 K/UL (ref 0–0.1)
BASOPHILS NFR BLD: 0 % (ref 0–1)
BILIRUB SERPL-MCNC: 1 MG/DL (ref 0.2–1)
BUN SERPL-MCNC: 13 MG/DL (ref 6–20)
BUN/CREAT SERPL: 20 (ref 12–20)
CALCIUM SERPL-MCNC: 9 MG/DL (ref 8.5–10.1)
CALCULATED P AXIS, ECG09: 43 DEGREES
CALCULATED R AXIS, ECG10: -23 DEGREES
CALCULATED T AXIS, ECG11: 27 DEGREES
CHLORIDE SERPL-SCNC: 103 MMOL/L (ref 97–108)
CO2 SERPL-SCNC: 27 MMOL/L (ref 21–32)
CREAT SERPL-MCNC: 0.66 MG/DL (ref 0.55–1.02)
DIAGNOSIS, 93000: NORMAL
DIFFERENTIAL METHOD BLD: ABNORMAL
EOSINOPHIL # BLD: 0 K/UL (ref 0–0.4)
EOSINOPHIL NFR BLD: 0 % (ref 0–7)
ERYTHROCYTE [DISTWIDTH] IN BLOOD BY AUTOMATED COUNT: 12.7 % (ref 11.5–14.5)
GLOBULIN SER CALC-MCNC: 4.2 G/DL (ref 2–4)
GLUCOSE SERPL-MCNC: 154 MG/DL (ref 65–100)
HCT VFR BLD AUTO: 41.2 % (ref 35–47)
HGB BLD-MCNC: 13.3 G/DL (ref 11.5–16)
LIPASE SERPL-CCNC: 247 U/L (ref 73–393)
LYMPHOCYTES # BLD: 0.6 K/UL (ref 0.8–3.5)
LYMPHOCYTES NFR BLD: 5 % (ref 12–49)
MAGNESIUM SERPL-MCNC: 2 MG/DL (ref 1.6–2.4)
MCH RBC QN AUTO: 29.8 PG (ref 26–34)
MCHC RBC AUTO-ENTMCNC: 32.3 G/DL (ref 30–36.5)
MCV RBC AUTO: 92.2 FL (ref 80–99)
MONOCYTES # BLD: 0.4 K/UL (ref 0–1)
MONOCYTES NFR BLD: 3 % (ref 5–13)
NEUTS SEG # BLD: 11.9 K/UL (ref 1.8–8)
NEUTS SEG NFR BLD: 92 % (ref 32–75)
P-R INTERVAL, ECG05: 170 MS
PLATELET # BLD AUTO: 348 K/UL (ref 150–400)
PLATELET COMMENTS,PCOM: ABNORMAL
PMV BLD AUTO: 9.7 FL (ref 8.9–12.9)
POTASSIUM SERPL-SCNC: 3.8 MMOL/L (ref 3.5–5.1)
PROT SERPL-MCNC: 7.9 G/DL (ref 6.4–8.2)
Q-T INTERVAL, ECG07: 412 MS
QRS DURATION, ECG06: 86 MS
QTC CALCULATION (BEZET), ECG08: 472 MS
RBC # BLD AUTO: 4.47 M/UL (ref 3.8–5.2)
RBC MORPH BLD: ABNORMAL
SODIUM SERPL-SCNC: 139 MMOL/L (ref 136–145)
TROPONIN I SERPL-MCNC: <0.05 NG/ML
VENTRICULAR RATE, ECG03: 79 BPM
WBC # BLD AUTO: 12.9 K/UL (ref 3.6–11)

## 2019-12-27 PROCEDURE — 74011250636 HC RX REV CODE- 250/636: Performed by: EMERGENCY MEDICINE

## 2019-12-27 PROCEDURE — 36415 COLL VENOUS BLD VENIPUNCTURE: CPT

## 2019-12-27 PROCEDURE — 83690 ASSAY OF LIPASE: CPT

## 2019-12-27 PROCEDURE — 84484 ASSAY OF TROPONIN QUANT: CPT

## 2019-12-27 PROCEDURE — 76705 ECHO EXAM OF ABDOMEN: CPT

## 2019-12-27 PROCEDURE — 80307 DRUG TEST PRSMV CHEM ANLYZR: CPT

## 2019-12-27 PROCEDURE — 74011250636 HC RX REV CODE- 250/636

## 2019-12-27 PROCEDURE — 85025 COMPLETE CBC W/AUTO DIFF WBC: CPT

## 2019-12-27 PROCEDURE — 99284 EMERGENCY DEPT VISIT MOD MDM: CPT

## 2019-12-27 PROCEDURE — 96372 THER/PROPH/DIAG INJ SC/IM: CPT

## 2019-12-27 PROCEDURE — 93005 ELECTROCARDIOGRAM TRACING: CPT

## 2019-12-27 PROCEDURE — 83735 ASSAY OF MAGNESIUM: CPT

## 2019-12-27 PROCEDURE — 96375 TX/PRO/DX INJ NEW DRUG ADDON: CPT

## 2019-12-27 PROCEDURE — 96361 HYDRATE IV INFUSION ADD-ON: CPT

## 2019-12-27 PROCEDURE — 96374 THER/PROPH/DIAG INJ IV PUSH: CPT

## 2019-12-27 PROCEDURE — 80053 COMPREHEN METABOLIC PANEL: CPT

## 2019-12-27 RX ORDER — ONDANSETRON 4 MG/1
4 TABLET, ORALLY DISINTEGRATING ORAL
Qty: 15 TAB | Refills: 0 | Status: SHIPPED | OUTPATIENT
Start: 2019-12-27 | End: 2020-07-25

## 2019-12-27 RX ORDER — ONDANSETRON 2 MG/ML
4 INJECTION INTRAMUSCULAR; INTRAVENOUS
Status: COMPLETED | OUTPATIENT
Start: 2019-12-27 | End: 2019-12-27

## 2019-12-27 RX ORDER — DICYCLOMINE HYDROCHLORIDE 10 MG/1
10 CAPSULE ORAL 4 TIMES DAILY
Qty: 20 CAP | Refills: 0 | Status: SHIPPED | OUTPATIENT
Start: 2019-12-27 | End: 2020-01-01

## 2019-12-27 RX ORDER — PROCHLORPERAZINE EDISYLATE 5 MG/ML
10 INJECTION INTRAMUSCULAR; INTRAVENOUS
Status: COMPLETED | OUTPATIENT
Start: 2019-12-27 | End: 2019-12-27

## 2019-12-27 RX ORDER — ONDANSETRON 2 MG/ML
INJECTION INTRAMUSCULAR; INTRAVENOUS
Status: COMPLETED
Start: 2019-12-27 | End: 2019-12-27

## 2019-12-27 RX ORDER — DICYCLOMINE HYDROCHLORIDE 10 MG/ML
20 INJECTION INTRAMUSCULAR
Status: COMPLETED | OUTPATIENT
Start: 2019-12-27 | End: 2019-12-27

## 2019-12-27 RX ORDER — DIPHENHYDRAMINE HYDROCHLORIDE 50 MG/ML
12.5 INJECTION, SOLUTION INTRAMUSCULAR; INTRAVENOUS
Status: COMPLETED | OUTPATIENT
Start: 2019-12-27 | End: 2019-12-27

## 2019-12-27 RX ADMIN — PROCHLORPERAZINE EDISYLATE 10 MG: 5 INJECTION INTRAMUSCULAR; INTRAVENOUS at 09:24

## 2019-12-27 RX ADMIN — ONDANSETRON 4 MG: 2 INJECTION INTRAMUSCULAR; INTRAVENOUS at 08:53

## 2019-12-27 RX ADMIN — DIPHENHYDRAMINE HYDROCHLORIDE 12.5 MG: 50 INJECTION, SOLUTION INTRAMUSCULAR; INTRAVENOUS at 09:25

## 2019-12-27 RX ADMIN — SODIUM CHLORIDE 1000 ML: 900 INJECTION, SOLUTION INTRAVENOUS at 08:53

## 2019-12-27 RX ADMIN — DICYCLOMINE HYDROCHLORIDE 20 MG: 10 INJECTION INTRAMUSCULAR at 08:55

## 2019-12-27 NOTE — DISCHARGE INSTRUCTIONS
Patient Education        Low-Fat Diet for Gallbladder Disease: Care Instructions  Your Care Instructions    When you eat, the gallbladder releases bile, which helps you digest the fat in food. If you have an inflamed gallbladder, this may cause pain. A low-fat diet may give your gallbladder a rest so you can start to heal. Your doctor and dietitian can help you make an eating plan that does not irritate your digestive system. Always talk with your doctor or dietitian before you make changes in your diet. Follow-up care is a key part of your treatment and safety. Be sure to make and go to all appointments, and call your doctor if you are having problems. It's also a good idea to know your test results and keep a list of the medicines you take. How can you care for yourself at home? · Eat many small meals and snacks each day instead of three large meals. · Choose lean meats. ? Eat no more than 5 to 6½ ounces of meat a day. ? Cut off all fat you can see. ? Eat chicken and turkey without the skin. ? Many types of fish, such as salmon, lake trout, tuna, and herring, provide healthy omega-3 fat. But, avoid fish canned in oil, such as sardines in olive oil. ? Bake, broil, or grill meats, poultry, or fish instead of frying them in butter or fat. · Drink or eat nonfat or low-fat milk, yogurt, cheese, or other milk products each day. ? Read the labels on cheeses, and choose those with less than 5 grams of fat an ounce. ? Try fat-free sour cream, cream cheese, or yogurt. ? Avoid cream soups and cream sauces on pasta. ? Eat low-fat ice cream, frozen yogurt, or sorbet. Avoid regular ice cream.  · Eat whole-grain cereals, breads, crackers, rice, or pasta. Avoid high-fat foods such as croissants, scones, biscuits, waffles, doughnuts, muffins, granola, and high-fat breads. · Flavor your foods with herbs and spices (such as basil, tarragon, or mint), fat-free sauces, or lemon juice instead of butter.  You can also use butter substitutes, fat-free mayonnaise, or fat-free dressing. · Try applesauce, prune puree, or mashed bananas to replace some or all of the fat when you bake. · Limit fats and oils, such as butter, margarine, mayonnaise, and salad dressing, to no more than 1 tablespoon a meal.  · Avoid high-fat foods, such as:  ? Chocolate, whole milk, ice cream, and processed cheese. ? Fried or buttered foods. ? Sausage, salami, and dia. ? Cinnamon rolls, cakes, pies, cookies, and other pastries. ? Prepared snack foods, such as potato chips, nut and granola bars, and mixed nuts. ? Coconut and avocado. · Learn how to read food labels for serving sizes and ingredients. Fast-food and convenience-food meals often have lots of fat. Where can you learn more? Go to http://stu-valentina.info/. Enter Z667 in the search box to learn more about \"Low-Fat Diet for Gallbladder Disease: Care Instructions. \"  Current as of: November 7, 2018  Content Version: 12.2  © 3655-5308 [employer]. Care instructions adapted under license by [employer] (which disclaims liability or warranty for this information). If you have questions about a medical condition or this instruction, always ask your healthcare professional. [place] [address] any warranty or liability for your use of this information. Patient Education        Biliary Colic: Care Instructions  Your Care Instructions    Biliary (say \"BILL-ee-air-ee\") colic is belly pain caused by gallbladder problems. It is usually caused by a gallstone moving through or blocking the common bile duct or cystic duct. Gallstones are stones that form in the gallbladder. They are made of cholesterol and other substances. The gallbladder is a small sac located just under the liver. It stores bile released by the liver. Bile helps you digest fats. Gallstones also can form in the common bile duct or cystic duct.  These ducts carry bile from the gallbladder and the liver to the small intestine. Gallstones may be as small as a grain of sand or as large as a golf ball. Gallstones that cause severe symptoms usually are treated with surgery to remove the gallbladder. If the first attack of biliary colic is mild, it is often safe to wait until you have had another attack before you think about having surgery. The doctor has checked you carefully, but problems can develop later. If you notice any problems or new symptoms, get medical treatment right away. Follow-up care is a key part of your treatment and safety. Be sure to make and go to all appointments, and call your doctor if you are having problems. It's also a good idea to know your test results and keep a list of the medicines you take. How can you care for yourself at home? · Take pain medicines exactly as directed. ? If the doctor gave you a prescription medicine for pain, take it as prescribed. ? If you are not taking a prescription pain medicine, ask your doctor if you can take an over-the-counter medicine. Read and follow all instructions on the label. · Avoid foods that cause symptoms, especially fatty foods. These can cause biliary colic. · You may need more tests to look at your gallbladder. When should you call for help? Call your doctor now or seek immediate medical care if:    · You have a fever.     · You have new belly pain, or your pain gets worse.     · There is a new or increasing yellow tint to your skin or the whites of your eyes.     · Your urine is dark yellow-brown, or your stools are light-colored or white.     · You cannot keep down fluids.    Watch closely for changes in your health, and be sure to contact your doctor if:    · You do not get better as expected.     · You are not getting better after 1 day (24 hours). Where can you learn more? Go to http://stu-valentina.info/.   Enter A308 in the search box to learn more about \"Biliary Colic: Care Instructions. \"  Current as of: November 7, 2018  Content Version: 12.2  © 2965-8414 [employer]. Care instructions adapted under license by [employer] (which disclaims liability or warranty for this information). If you have questions about a medical condition or this instruction, always ask your healthcare professional. [address] any warranty or liability for your use of this information. Patient Education        Gastroenteritis: Care Instructions  Your Care Instructions    Gastroenteritis is an illness that may cause nausea, vomiting, and diarrhea. It is sometimes called \"stomach flu. \" It can be caused by bacteria or a virus. You will probably begin to feel better in 1 to 2 days. In the meantime, get plenty of rest and make sure you do not become dehydrated. Dehydration occurs when your body loses too much fluid. Follow-up care is a key part of your treatment and safety. Be sure to make and go to all appointments, and call your doctor if you are having problems. It's also a good idea to know your test results and keep a list of the medicines you take. How can you care for yourself at home? · If your doctor prescribed antibiotics, take them as directed. Do not stop taking them just because you feel better. You need to take the full course of antibiotics. · Drink plenty of fluids to prevent dehydration, enough so that your urine is light yellow or clear like water. Choose water and other caffeine-free clear liquids until you feel better. If you have kidney, heart, or liver disease and have to limit fluids, talk with your doctor before you increase your fluid intake. · Drink fluids slowly, in frequent, small amounts, because drinking too much too fast can cause vomiting. · Begin eating mild foods, such as dry toast, yogurt, applesauce, bananas, and rice. Avoid spicy, hot, or high-fat foods, and do not drink alcohol or caffeine for a day or two.  Do not drink milk or eat ice cream until you are feeling better. How to prevent gastroenteritis  · Keep hot foods hot and cold foods cold. · Do not eat meats, dressings, salads, or other foods that have been kept at room temperature for more than 2 hours. · Use a thermometer to check your refrigerator. It should be between 34°F and 40°F.  · Defrost meats in the refrigerator or microwave, not on the kitchen counter. · Keep your hands and your kitchen clean. Wash your hands, cutting boards, and countertops with hot soapy water frequently. · Cook meat until it is well done. · Do not eat raw eggs or uncooked sauces made with raw eggs. · Do not take chances. If food looks or tastes spoiled, throw it out. When should you call for help? Call 911 anytime you think you may need emergency care. For example, call if:    · You vomit blood or what looks like coffee grounds.     · You passed out (lost consciousness).     · You pass maroon or very bloody stools.    Call your doctor now or seek immediate medical care if:    · You have severe belly pain.     · You have signs of needing more fluids. You have sunken eyes, a dry mouth, and pass only a little dark urine.     · You feel like you are going to faint.     · You have increased belly pain that does not go away in 1 to 2 days.     · You have new or increased nausea, or you are vomiting.     · You have a new or higher fever.     · Your stools are black and tarlike or have streaks of blood.    Watch closely for changes in your health, and be sure to contact your doctor if:    · You are dizzy or lightheaded.     · You urinate less than usual, or your urine is dark yellow or brown.     · You do not feel better with each day that goes by. Where can you learn more? Go to http://stu-valentina.info/. Enter N142 in the search box to learn more about \"Gastroenteritis: Care Instructions. \"  Current as of: June 9, 2019  Content Version: 12.2  © 4638-8584 [employer]. Care instructions adapted under license by [employer] (which disclaims liability or warranty for this information). If you have questions about a medical condition or this instruction, always ask your healthcare professional. [address] any warranty or liability for your use of this information.

## 2019-12-27 NOTE — ED NOTES
Informed Dr Lyssa Grace that pt is still very nauseated some of her pain has settled.   She is not able to swallow GI cocktail requesting something more for nausea

## 2019-12-27 NOTE — ED NOTES
Pt states that she still does not want to take the GI cocktail still having a little nausea informed DR Montrell Arvizu

## 2019-12-27 NOTE — ED PROVIDER NOTES
HPI     Pt is a 47 y.o. F with PMH of gastritis here with c/o upper abdominal pain since last night. She has had associated N/V/D as well. She has had several episodes of vomiting through the night. She is a  at an independent living facility so possibly exposed to sick contacts. Per EMR, she has hx of gastritis diagnosed by PCP in 2019 after a follow up from ED visit where she was seen for upper abdominal pain as well. No other complaints at this time.       Past Medical History:   Diagnosis Date    Depression     after   2012    Panic disorder        Past Surgical History:   Procedure Laterality Date    HX  SECTION  34 yrs ago    [facility] HX  SECTION  28 years ago   [facility] HX GYN           Family History:   Family history unknown: Yes       Social History     Socioeconomic History    Marital status:      Spouse name: Not on file    Number of children: Not on file    Years of education: Not on file    Highest education level: Not on file   Occupational History    Not on file   Social Needs    Financial resource strain: Not on file    Food insecurity:     Worry: Not on file     Inability: Not on file    Transportation needs:     Medical: Not on file     Non-medical: Not on file   Tobacco Use    Smoking status: Former Smoker    Smokeless tobacco: Never Used   Substance and Sexual Activity    Alcohol use: No     Alcohol/week: 0.0 standard drinks    Drug use: No    Sexual activity: Not Currently   Lifestyle    Physical activity:     Days per week: Not on file     Minutes per session: Not on file    Stress: Not on file   Relationships    Social connections:     Talks on phone: Not on file     Gets together: Not on file     Attends Yarsani service: Not on file     Active member of club or organization: Not on file     Attends meetings of clubs or organizations: Not on file     Relationship status: Not on file    Intimate partner violence:     Fear of current or ex partner: Not on file     Emotionally abused: Not on file     Physically abused: Not on file     Forced sexual activity: Not on file   Other Topics Concern    Not on file   Social History Narrative    Not on file         ALLERGIES: Patient has no known allergies. Review of Systems   Constitutional: Negative for chills, diaphoresis and fever. HENT: Negative for congestion and trouble swallowing. Eyes: Negative for photophobia and visual disturbance. Respiratory: Negative for cough, chest tightness and shortness of breath. Cardiovascular: Negative for chest pain, palpitations and leg swelling. Gastrointestinal: Positive for abdominal pain, diarrhea, nausea and vomiting. Genitourinary: Negative for difficulty urinating, dysuria, flank pain and frequency. Musculoskeletal: Negative for back pain and myalgias. Skin: Negative for rash and wound. Neurological: Negative for dizziness, weakness, light-headedness and headaches. Hematological: Negative for adenopathy. Does not bruise/bleed easily. Psychiatric/Behavioral: Negative for agitation and confusion. All other systems reviewed and are negative. Visit Vitals  /67 (BP 1 Location: Left arm, BP Patient Position: Sitting)   Pulse 81   Temp 98 °F (36.7 °C)   Resp 16   Ht 5' 10\" (1.778 m)   Wt 86.2 kg (190 lb)   SpO2 100%   BMI 27.26 kg/m²           Physical Exam  Vitals signs and nursing note reviewed. Constitutional:       General: She is not in acute distress. Appearance: She is well-developed. She is not diaphoretic. HENT:      Head: Normocephalic. Eyes:      Conjunctiva/sclera: Conjunctivae normal.      Pupils: Pupils are equal, round, and reactive to light. Neck:      Musculoskeletal: Normal range of motion and neck supple. Vascular: No JVD. Cardiovascular:      Rate and Rhythm: Normal rate and regular rhythm. Heart sounds: Normal heart sounds.    Pulmonary:      Effort: Pulmonary effort is normal.      Breath sounds: Normal breath sounds. Abdominal:      General: Bowel sounds are normal. There is no distension. Palpations: Abdomen is soft. Tenderness: There is tenderness in the right upper quadrant, epigastric area and left upper quadrant. Musculoskeletal: Normal range of motion. General: No tenderness or deformity. Lymphadenopathy:      Cervical: No cervical adenopathy. Skin:     General: Skin is warm and dry. Capillary Refill: Capillary refill takes less than 2 seconds. Findings: No erythema or rash. Neurological:      Mental Status: She is alert and oriented to person, place, and time. Cranial Nerves: No cranial nerve deficit. Sensory: No sensory deficit. MDM       Procedures      ED EKG interpretation:  Rhythm: normal sinus rhythm; and regular . Rate (approx.): 79; Axis: normal; P wave: normal; QRS interval: normal ; ST/T wave: normal; EKG documented by Cornell Redmond MD, as interpreted by Milly Ryan MD, ED MD.    9:21 AM  Pt's pain improving but she is still having nausea. Will order more medication. 9:26 AM  Pt's labs reviewed. Leukocytosis may be 2/2 to vomiting several times. She also has elevated LFTs thus I have added on tylenol level and going to get US 2/2 to labs and tenderness in upper abdomen. 10:20 AM  Pt feeling better and wants something to drink. Will try PO challenge while waiting on US results. Her pain has subsided with bentyl. She refused GI cocktail. Discussed US results with pt. She is tolerating PO.    10:50 AM  Patient's results have been reviewed with them. Patient and/or family have verbally conveyed their understanding and agreement of the patient's signs, symptoms, diagnosis, treatment and prognosis and additionally agree to follow up as recommended or return to the Emergency Room should their condition change prior to follow-up.   Discharge instructions have also been provided to the patient with some educational information regarding their diagnosis as well a list of reasons why they would want to return to the ER prior to their follow-up appointment should their condition change.     Lewis Turk MD

## 2019-12-27 NOTE — ED TRIAGE NOTES
Pt ambulates to treatment area she states that since last night she has had upper abdominal pain with several episodes of vomiting throughout the night. She is the  at an [address] [place] [address] facility and unsure if she picked up something there while at work.   She also has some larygitis

## 2019-12-27 NOTE — LETTER
[facility] EMERGENCY DEPT 
[address] [facility] [place] [address] 
[phone] Work/School Note Date: 12/27/2019 To Whom It May concern: 
 
Alexa Xiao was seen and treated today. Alexa Xiao may return to work on 12/30/19.  
 
Sincerely, 
 
 
 
 
Husam Kam MD

## 2020-07-25 ENCOUNTER — ANESTHESIA EVENT (OUTPATIENT)
Dept: SURGERY | Age: 55
DRG: 419 | End: 2020-07-25
Payer: COMMERCIAL

## 2020-07-25 ENCOUNTER — ANESTHESIA (OUTPATIENT)
Dept: SURGERY | Age: 55
DRG: 419 | End: 2020-07-25
Payer: COMMERCIAL

## 2020-07-25 ENCOUNTER — APPOINTMENT (OUTPATIENT)
Dept: ULTRASOUND IMAGING | Age: 55
DRG: 419 | End: 2020-07-25
Attending: EMERGENCY MEDICINE
Payer: COMMERCIAL

## 2020-07-25 ENCOUNTER — HOSPITAL ENCOUNTER (INPATIENT)
Age: 55
LOS: 2 days | Discharge: HOME | DRG: 419 | End: 2020-07-27
Attending: EMERGENCY MEDICINE | Admitting: FAMILY MEDICINE
Payer: COMMERCIAL

## 2020-07-25 DIAGNOSIS — K80.50 CHOLEDOCHOLITHIASIS: Primary | ICD-10-CM

## 2020-07-25 PROBLEM — K81.9 CHOLECYSTITIS: Status: ACTIVE | Noted: 2020-07-25

## 2020-07-25 PROBLEM — R16.0 HEPATOMEGALY: Status: ACTIVE | Noted: 2020-07-25

## 2020-07-25 LAB
ALBUMIN SERPL-MCNC: 4 G/DL (ref 3.5–5)
ALBUMIN/GLOB SERPL: 1 {RATIO} (ref 1.1–2.2)
ALP SERPL-CCNC: 88 U/L (ref 45–117)
ALT SERPL-CCNC: 32 U/L (ref 12–78)
ANION GAP SERPL CALC-SCNC: 8 MMOL/L (ref 5–15)
APPEARANCE UR: CLEAR
AST SERPL-CCNC: 51 U/L (ref 15–37)
BACTERIA URNS QL MICRO: NEGATIVE /HPF
BASOPHILS # BLD: 0.1 K/UL (ref 0–0.1)
BASOPHILS NFR BLD: 1 % (ref 0–1)
BILIRUB SERPL-MCNC: 0.4 MG/DL (ref 0.2–1)
BILIRUB UR QL: NEGATIVE
BUN SERPL-MCNC: 10 MG/DL (ref 6–20)
BUN/CREAT SERPL: 13 (ref 12–20)
CALCIUM SERPL-MCNC: 9.7 MG/DL (ref 8.5–10.1)
CHLORIDE SERPL-SCNC: 102 MMOL/L (ref 97–108)
CO2 SERPL-SCNC: 29 MMOL/L (ref 21–32)
COLOR UR: ABNORMAL
COMMENT, HOLDF: NORMAL
COVID-19 RAPID TEST, COVR: NOT DETECTED
CREAT SERPL-MCNC: 0.78 MG/DL (ref 0.55–1.02)
DIFFERENTIAL METHOD BLD: ABNORMAL
EOSINOPHIL # BLD: 0.2 K/UL (ref 0–0.4)
EOSINOPHIL NFR BLD: 2 % (ref 0–7)
EPITH CASTS URNS QL MICRO: ABNORMAL /LPF
ERYTHROCYTE [DISTWIDTH] IN BLOOD BY AUTOMATED COUNT: 12.2 % (ref 11.5–14.5)
GLOBULIN SER CALC-MCNC: 4.1 G/DL (ref 2–4)
GLUCOSE SERPL-MCNC: 137 MG/DL (ref 65–100)
GLUCOSE UR STRIP.AUTO-MCNC: NEGATIVE MG/DL
HCT VFR BLD AUTO: 43.4 % (ref 35–47)
HGB BLD-MCNC: 13.8 G/DL (ref 11.5–16)
HGB UR QL STRIP: NEGATIVE
IMM GRANULOCYTES # BLD AUTO: 0 K/UL (ref 0–0.04)
IMM GRANULOCYTES NFR BLD AUTO: 0 % (ref 0–0.5)
KETONES UR QL STRIP.AUTO: NEGATIVE MG/DL
LACTATE SERPL-SCNC: 1.9 MMOL/L (ref 0.4–2)
LEUKOCYTE ESTERASE UR QL STRIP.AUTO: ABNORMAL
LIPASE SERPL-CCNC: 190 U/L (ref 73–393)
LYMPHOCYTES # BLD: 3.7 K/UL (ref 0.8–3.5)
LYMPHOCYTES NFR BLD: 41 % (ref 12–49)
MCH RBC QN AUTO: 29.6 PG (ref 26–34)
MCHC RBC AUTO-ENTMCNC: 31.8 G/DL (ref 30–36.5)
MCV RBC AUTO: 92.9 FL (ref 80–99)
MONOCYTES # BLD: 0.6 K/UL (ref 0–1)
MONOCYTES NFR BLD: 6 % (ref 5–13)
NEUTS SEG # BLD: 4.6 K/UL (ref 1.8–8)
NEUTS SEG NFR BLD: 50 % (ref 32–75)
NITRITE UR QL STRIP.AUTO: NEGATIVE
NRBC # BLD: 0 K/UL (ref 0–0.01)
NRBC BLD-RTO: 0 PER 100 WBC
PH UR STRIP: 8 [PH] (ref 5–8)
PLATELET # BLD AUTO: 417 K/UL (ref 150–400)
PMV BLD AUTO: 10 FL (ref 8.9–12.9)
POTASSIUM SERPL-SCNC: 3.8 MMOL/L (ref 3.5–5.1)
PROT SERPL-MCNC: 8.1 G/DL (ref 6.4–8.2)
PROT UR STRIP-MCNC: NEGATIVE MG/DL
RBC # BLD AUTO: 4.67 M/UL (ref 3.8–5.2)
RBC #/AREA URNS HPF: ABNORMAL /HPF (ref 0–5)
SAMPLES BEING HELD,HOLD: NORMAL
SODIUM SERPL-SCNC: 139 MMOL/L (ref 136–145)
SOURCE, COVRS: NORMAL
SP GR UR REFRACTOMETRY: 1.02 (ref 1–1.03)
SPECIMEN SOURCE, FCOV2M: NORMAL
UR CULT HOLD, URHOLD: NORMAL
UROBILINOGEN UR QL STRIP.AUTO: 0.2 EU/DL (ref 0.2–1)
WBC # BLD AUTO: 9.2 K/UL (ref 3.6–11)
WBC URNS QL MICRO: ABNORMAL /HPF (ref 0–4)

## 2020-07-25 PROCEDURE — 74011250636 HC RX REV CODE- 250/636: Performed by: SURGERY

## 2020-07-25 PROCEDURE — 83605 ASSAY OF LACTIC ACID: CPT

## 2020-07-25 PROCEDURE — 80053 COMPREHEN METABOLIC PANEL: CPT

## 2020-07-25 PROCEDURE — 77030040361 HC SLV COMPR DVT MDII -B

## 2020-07-25 PROCEDURE — 74011250636 HC RX REV CODE- 250/636: Performed by: EMERGENCY MEDICINE

## 2020-07-25 PROCEDURE — 77030020053 HC ELECTRD LAPSCP COVD -B: Performed by: SURGERY

## 2020-07-25 PROCEDURE — 74011000250 HC RX REV CODE- 250: Performed by: SURGERY

## 2020-07-25 PROCEDURE — 77030035029 HC NDL INSUF VERES DISP COVD -B: Performed by: SURGERY

## 2020-07-25 PROCEDURE — 87635 SARS-COV-2 COVID-19 AMP PRB: CPT

## 2020-07-25 PROCEDURE — 83690 ASSAY OF LIPASE: CPT

## 2020-07-25 PROCEDURE — 76210000016 HC OR PH I REC 1 TO 1.5 HR: Performed by: SURGERY

## 2020-07-25 PROCEDURE — 74011250636 HC RX REV CODE- 250/636: Performed by: NURSE ANESTHETIST, CERTIFIED REGISTERED

## 2020-07-25 PROCEDURE — 36415 COLL VENOUS BLD VENIPUNCTURE: CPT

## 2020-07-25 PROCEDURE — 74011000250 HC RX REV CODE- 250: Performed by: EMERGENCY MEDICINE

## 2020-07-25 PROCEDURE — 76010000149 HC OR TIME 1 TO 1.5 HR: Performed by: SURGERY

## 2020-07-25 PROCEDURE — 96375 TX/PRO/DX INJ NEW DRUG ADDON: CPT

## 2020-07-25 PROCEDURE — 85025 COMPLETE CBC W/AUTO DIFF WBC: CPT

## 2020-07-25 PROCEDURE — 81001 URINALYSIS AUTO W/SCOPE: CPT

## 2020-07-25 PROCEDURE — 77030008608 HC TRCR ENDOSC SMTH AMR -B: Performed by: SURGERY

## 2020-07-25 PROCEDURE — 77030008684 HC TU ET CUF COVD -B: Performed by: ANESTHESIOLOGY

## 2020-07-25 PROCEDURE — 74011000250 HC RX REV CODE- 250: Performed by: NURSE ANESTHETIST, CERTIFIED REGISTERED

## 2020-07-25 PROCEDURE — 88304 TISSUE EXAM BY PATHOLOGIST: CPT

## 2020-07-25 PROCEDURE — 77030026438 HC STYL ET INTUB CARD -A: Performed by: ANESTHESIOLOGY

## 2020-07-25 PROCEDURE — 77030010935 HC CLP LIG ABSRB TELE -B: Performed by: SURGERY

## 2020-07-25 PROCEDURE — 76060000033 HC ANESTHESIA 1 TO 1.5 HR: Performed by: SURGERY

## 2020-07-25 PROCEDURE — 77030020829: Performed by: SURGERY

## 2020-07-25 PROCEDURE — 77030037032 HC INSRT SCIS CLICKLLINE DISP STOR -B: Performed by: SURGERY

## 2020-07-25 PROCEDURE — 93005 ELECTROCARDIOGRAM TRACING: CPT

## 2020-07-25 PROCEDURE — C9113 INJ PANTOPRAZOLE SODIUM, VIA: HCPCS | Performed by: STUDENT IN AN ORGANIZED HEALTH CARE EDUCATION/TRAINING PROGRAM

## 2020-07-25 PROCEDURE — 0FT44ZZ RESECTION OF GALLBLADDER, PERCUTANEOUS ENDOSCOPIC APPROACH: ICD-10-PCS | Performed by: SURGERY

## 2020-07-25 PROCEDURE — 77030011640 HC PAD GRND REM COVD -A: Performed by: SURGERY

## 2020-07-25 PROCEDURE — 77030012770 HC TRCR OPT FX AMR -B: Performed by: SURGERY

## 2020-07-25 PROCEDURE — 74011250636 HC RX REV CODE- 250/636: Performed by: STUDENT IN AN ORGANIZED HEALTH CARE EDUCATION/TRAINING PROGRAM

## 2020-07-25 PROCEDURE — 77030040922 HC BLNKT HYPOTHRM STRY -A

## 2020-07-25 PROCEDURE — 74011250636 HC RX REV CODE- 250/636: Performed by: ANESTHESIOLOGY

## 2020-07-25 PROCEDURE — 77030031139 HC SUT VCRL2 J&J -A: Performed by: SURGERY

## 2020-07-25 PROCEDURE — 99285 EMERGENCY DEPT VISIT HI MDM: CPT

## 2020-07-25 PROCEDURE — 76705 ECHO EXAM OF ABDOMEN: CPT

## 2020-07-25 PROCEDURE — 96374 THER/PROPH/DIAG INJ IV PUSH: CPT

## 2020-07-25 PROCEDURE — 65270000029 HC RM PRIVATE

## 2020-07-25 PROCEDURE — 77030009851 HC PCH RTVR ENDOSC AMR -B: Performed by: SURGERY

## 2020-07-25 PROCEDURE — 77030010507 HC ADH SKN DERMBND J&J -B: Performed by: SURGERY

## 2020-07-25 PROCEDURE — 77030018836 HC SOL IRR NACL ICUM -A: Performed by: SURGERY

## 2020-07-25 PROCEDURE — 77030013079 HC BLNKT BAIR HGGR 3M -A: Performed by: ANESTHESIOLOGY

## 2020-07-25 RX ORDER — LIDOCAINE HYDROCHLORIDE 20 MG/ML
INJECTION, SOLUTION EPIDURAL; INFILTRATION; INTRACAUDAL; PERINEURAL AS NEEDED
Status: DISCONTINUED | OUTPATIENT
Start: 2020-07-25 | End: 2020-07-25 | Stop reason: HOSPADM

## 2020-07-25 RX ORDER — MORPHINE SULFATE 2 MG/ML
1 INJECTION, SOLUTION INTRAMUSCULAR; INTRAVENOUS
Status: DISCONTINUED | OUTPATIENT
Start: 2020-07-25 | End: 2020-07-25

## 2020-07-25 RX ORDER — NEOSTIGMINE METHYLSULFATE 1 MG/ML
INJECTION, SOLUTION INTRAVENOUS AS NEEDED
Status: DISCONTINUED | OUTPATIENT
Start: 2020-07-25 | End: 2020-07-25 | Stop reason: HOSPADM

## 2020-07-25 RX ORDER — PROPOFOL 10 MG/ML
INJECTION, EMULSION INTRAVENOUS AS NEEDED
Status: DISCONTINUED | OUTPATIENT
Start: 2020-07-25 | End: 2020-07-25 | Stop reason: HOSPADM

## 2020-07-25 RX ORDER — GUAIFENESIN 100 MG/5ML
81 LIQUID (ML) ORAL DAILY
COMMUNITY

## 2020-07-25 RX ORDER — OXYCODONE HYDROCHLORIDE 5 MG/1
10 TABLET ORAL
Status: DISCONTINUED | OUTPATIENT
Start: 2020-07-25 | End: 2020-07-27 | Stop reason: HOSPADM

## 2020-07-25 RX ORDER — SODIUM CHLORIDE 0.9 % (FLUSH) 0.9 %
5-40 SYRINGE (ML) INJECTION AS NEEDED
Status: DISCONTINUED | OUTPATIENT
Start: 2020-07-25 | End: 2020-07-25 | Stop reason: HOSPADM

## 2020-07-25 RX ORDER — NALOXONE HYDROCHLORIDE 0.4 MG/ML
0.04 INJECTION, SOLUTION INTRAMUSCULAR; INTRAVENOUS; SUBCUTANEOUS
Status: DISCONTINUED | OUTPATIENT
Start: 2020-07-25 | End: 2020-07-25 | Stop reason: HOSPADM

## 2020-07-25 RX ORDER — HYDROMORPHONE HYDROCHLORIDE 1 MG/ML
.25-1 INJECTION, SOLUTION INTRAMUSCULAR; INTRAVENOUS; SUBCUTANEOUS
Status: DISCONTINUED | OUTPATIENT
Start: 2020-07-25 | End: 2020-07-25 | Stop reason: HOSPADM

## 2020-07-25 RX ORDER — ROCURONIUM BROMIDE 10 MG/ML
INJECTION, SOLUTION INTRAVENOUS AS NEEDED
Status: DISCONTINUED | OUTPATIENT
Start: 2020-07-25 | End: 2020-07-25 | Stop reason: HOSPADM

## 2020-07-25 RX ORDER — SUCCINYLCHOLINE CHLORIDE 20 MG/ML
INJECTION INTRAMUSCULAR; INTRAVENOUS AS NEEDED
Status: DISCONTINUED | OUTPATIENT
Start: 2020-07-25 | End: 2020-07-25 | Stop reason: HOSPADM

## 2020-07-25 RX ORDER — DEXAMETHASONE SODIUM PHOSPHATE 4 MG/ML
INJECTION, SOLUTION INTRA-ARTICULAR; INTRALESIONAL; INTRAMUSCULAR; INTRAVENOUS; SOFT TISSUE AS NEEDED
Status: DISCONTINUED | OUTPATIENT
Start: 2020-07-25 | End: 2020-07-25 | Stop reason: HOSPADM

## 2020-07-25 RX ORDER — FLUMAZENIL 0.1 MG/ML
0.2 INJECTION INTRAVENOUS
Status: DISCONTINUED | OUTPATIENT
Start: 2020-07-25 | End: 2020-07-25 | Stop reason: HOSPADM

## 2020-07-25 RX ORDER — OXYCODONE HYDROCHLORIDE 5 MG/1
5 TABLET ORAL
Status: DISCONTINUED | OUTPATIENT
Start: 2020-07-25 | End: 2020-07-27 | Stop reason: HOSPADM

## 2020-07-25 RX ORDER — BUPIVACAINE HYDROCHLORIDE 5 MG/ML
INJECTION, SOLUTION EPIDURAL; INTRACAUDAL AS NEEDED
Status: DISCONTINUED | OUTPATIENT
Start: 2020-07-25 | End: 2020-07-25 | Stop reason: HOSPADM

## 2020-07-25 RX ORDER — ALBUTEROL SULFATE 0.83 MG/ML
2.5 SOLUTION RESPIRATORY (INHALATION) AS NEEDED
Status: DISCONTINUED | OUTPATIENT
Start: 2020-07-25 | End: 2020-07-25 | Stop reason: HOSPADM

## 2020-07-25 RX ORDER — MIDAZOLAM HYDROCHLORIDE 1 MG/ML
INJECTION, SOLUTION INTRAMUSCULAR; INTRAVENOUS AS NEEDED
Status: DISCONTINUED | OUTPATIENT
Start: 2020-07-25 | End: 2020-07-25 | Stop reason: HOSPADM

## 2020-07-25 RX ORDER — SODIUM CHLORIDE 0.9 % (FLUSH) 0.9 %
5-40 SYRINGE (ML) INJECTION EVERY 8 HOURS
Status: DISCONTINUED | OUTPATIENT
Start: 2020-07-25 | End: 2020-07-25 | Stop reason: HOSPADM

## 2020-07-25 RX ORDER — SODIUM CHLORIDE 9 MG/ML
125 INJECTION, SOLUTION INTRAVENOUS CONTINUOUS
Status: DISCONTINUED | OUTPATIENT
Start: 2020-07-25 | End: 2020-07-25

## 2020-07-25 RX ORDER — KETOROLAC TROMETHAMINE 30 MG/ML
30 INJECTION, SOLUTION INTRAMUSCULAR; INTRAVENOUS
Status: COMPLETED | OUTPATIENT
Start: 2020-07-25 | End: 2020-07-25

## 2020-07-25 RX ORDER — ACETAMINOPHEN 650 MG/1
650 SUPPOSITORY RECTAL
Status: DISCONTINUED | OUTPATIENT
Start: 2020-07-25 | End: 2020-07-27 | Stop reason: HOSPADM

## 2020-07-25 RX ORDER — FENTANYL CITRATE 50 UG/ML
INJECTION, SOLUTION INTRAMUSCULAR; INTRAVENOUS AS NEEDED
Status: DISCONTINUED | OUTPATIENT
Start: 2020-07-25 | End: 2020-07-25 | Stop reason: HOSPADM

## 2020-07-25 RX ORDER — ESCITALOPRAM OXALATE 10 MG/1
5 TABLET ORAL EVERY EVENING
Status: DISCONTINUED | OUTPATIENT
Start: 2020-07-25 | End: 2020-07-27 | Stop reason: HOSPADM

## 2020-07-25 RX ORDER — GLYCOPYRROLATE 0.2 MG/ML
INJECTION INTRAMUSCULAR; INTRAVENOUS AS NEEDED
Status: DISCONTINUED | OUTPATIENT
Start: 2020-07-25 | End: 2020-07-25 | Stop reason: HOSPADM

## 2020-07-25 RX ORDER — FENTANYL CITRATE 50 UG/ML
25 INJECTION, SOLUTION INTRAMUSCULAR; INTRAVENOUS
Status: DISCONTINUED | OUTPATIENT
Start: 2020-07-25 | End: 2020-07-25 | Stop reason: HOSPADM

## 2020-07-25 RX ORDER — ONDANSETRON 2 MG/ML
INJECTION INTRAMUSCULAR; INTRAVENOUS AS NEEDED
Status: DISCONTINUED | OUTPATIENT
Start: 2020-07-25 | End: 2020-07-25 | Stop reason: HOSPADM

## 2020-07-25 RX ORDER — KETOROLAC TROMETHAMINE 30 MG/ML
15 INJECTION, SOLUTION INTRAMUSCULAR; INTRAVENOUS
Status: DISCONTINUED | OUTPATIENT
Start: 2020-07-25 | End: 2020-07-27 | Stop reason: HOSPADM

## 2020-07-25 RX ORDER — SODIUM CHLORIDE, SODIUM LACTATE, POTASSIUM CHLORIDE, CALCIUM CHLORIDE 600; 310; 30; 20 MG/100ML; MG/100ML; MG/100ML; MG/100ML
125 INJECTION, SOLUTION INTRAVENOUS CONTINUOUS
Status: DISCONTINUED | OUTPATIENT
Start: 2020-07-25 | End: 2020-07-25 | Stop reason: HOSPADM

## 2020-07-25 RX ORDER — ONDANSETRON 2 MG/ML
6 INJECTION INTRAMUSCULAR; INTRAVENOUS
Status: DISCONTINUED | OUTPATIENT
Start: 2020-07-25 | End: 2020-07-27 | Stop reason: HOSPADM

## 2020-07-25 RX ORDER — SODIUM CHLORIDE 0.9 % (FLUSH) 0.9 %
5-40 SYRINGE (ML) INJECTION EVERY 8 HOURS
Status: DISCONTINUED | OUTPATIENT
Start: 2020-07-25 | End: 2020-07-27 | Stop reason: HOSPADM

## 2020-07-25 RX ORDER — LIDOCAINE HYDROCHLORIDE 10 MG/ML
0.1 INJECTION, SOLUTION EPIDURAL; INFILTRATION; INTRACAUDAL; PERINEURAL AS NEEDED
Status: DISCONTINUED | OUTPATIENT
Start: 2020-07-25 | End: 2020-07-25 | Stop reason: HOSPADM

## 2020-07-25 RX ORDER — ONDANSETRON 2 MG/ML
4 INJECTION INTRAMUSCULAR; INTRAVENOUS
Status: COMPLETED | OUTPATIENT
Start: 2020-07-25 | End: 2020-07-25

## 2020-07-25 RX ORDER — DIPHENHYDRAMINE HYDROCHLORIDE 50 MG/ML
12.5 INJECTION, SOLUTION INTRAMUSCULAR; INTRAVENOUS AS NEEDED
Status: DISCONTINUED | OUTPATIENT
Start: 2020-07-25 | End: 2020-07-25 | Stop reason: HOSPADM

## 2020-07-25 RX ORDER — SODIUM CHLORIDE 0.9 % (FLUSH) 0.9 %
5-40 SYRINGE (ML) INJECTION AS NEEDED
Status: DISCONTINUED | OUTPATIENT
Start: 2020-07-25 | End: 2020-07-27 | Stop reason: HOSPADM

## 2020-07-25 RX ORDER — ONDANSETRON 2 MG/ML
4 INJECTION INTRAMUSCULAR; INTRAVENOUS AS NEEDED
Status: DISCONTINUED | OUTPATIENT
Start: 2020-07-25 | End: 2020-07-25 | Stop reason: HOSPADM

## 2020-07-25 RX ADMIN — GLYCOPYRROLATE 0.4 MG: 0.2 INJECTION INTRAMUSCULAR; INTRAVENOUS at 18:22

## 2020-07-25 RX ADMIN — DEXAMETHASONE SODIUM PHOSPHATE 4 MG: 4 INJECTION, SOLUTION INTRAMUSCULAR; INTRAVENOUS at 17:20

## 2020-07-25 RX ADMIN — SODIUM CHLORIDE, SODIUM LACTATE, POTASSIUM CHLORIDE, AND CALCIUM CHLORIDE 125 ML/HR: 600; 310; 30; 20 INJECTION, SOLUTION INTRAVENOUS at 16:53

## 2020-07-25 RX ADMIN — Medication 10 ML: at 14:59

## 2020-07-25 RX ADMIN — MIDAZOLAM HYDROCHLORIDE 2 MG: 2 INJECTION, SOLUTION INTRAMUSCULAR; INTRAVENOUS at 17:07

## 2020-07-25 RX ADMIN — SUCCINYLCHOLINE CHLORIDE 80 MG: 20 INJECTION, SOLUTION INTRAMUSCULAR; INTRAVENOUS; PARENTERAL at 17:13

## 2020-07-25 RX ADMIN — SODIUM CHLORIDE 1000 ML: 900 INJECTION, SOLUTION INTRAVENOUS at 03:54

## 2020-07-25 RX ADMIN — KETOROLAC TROMETHAMINE 30 MG: 30 INJECTION, SOLUTION INTRAMUSCULAR at 03:56

## 2020-07-25 RX ADMIN — SODIUM CHLORIDE 125 ML/HR: 900 INJECTION, SOLUTION INTRAVENOUS at 12:19

## 2020-07-25 RX ADMIN — KETOROLAC TROMETHAMINE 15 MG: 30 INJECTION, SOLUTION INTRAMUSCULAR at 23:12

## 2020-07-25 RX ADMIN — CEFAZOLIN SODIUM 2 G: 1 POWDER, FOR SOLUTION INTRAMUSCULAR; INTRAVENOUS at 17:22

## 2020-07-25 RX ADMIN — LIDOCAINE HYDROCHLORIDE 50 MG: 20 INJECTION, SOLUTION EPIDURAL; INFILTRATION; INTRACAUDAL; PERINEURAL at 17:13

## 2020-07-25 RX ADMIN — Medication 10 ML: at 12:08

## 2020-07-25 RX ADMIN — FENTANYL CITRATE 50 MCG: 50 INJECTION, SOLUTION INTRAMUSCULAR; INTRAVENOUS at 17:44

## 2020-07-25 RX ADMIN — ONDANSETRON 4 MG: 2 INJECTION INTRAMUSCULAR; INTRAVENOUS at 03:56

## 2020-07-25 RX ADMIN — ROCURONIUM BROMIDE 5 MG: 10 INJECTION, SOLUTION INTRAVENOUS at 17:13

## 2020-07-25 RX ADMIN — FENTANYL CITRATE 100 MCG: 50 INJECTION, SOLUTION INTRAMUSCULAR; INTRAVENOUS at 17:13

## 2020-07-25 RX ADMIN — MEPERIDINE HYDROCHLORIDE 25 MG: 25 INJECTION INTRAMUSCULAR; INTRAVENOUS; SUBCUTANEOUS at 18:56

## 2020-07-25 RX ADMIN — Medication 3 MG: at 18:22

## 2020-07-25 RX ADMIN — FENTANYL CITRATE 50 MCG: 50 INJECTION, SOLUTION INTRAMUSCULAR; INTRAVENOUS at 17:25

## 2020-07-25 RX ADMIN — WATER 1 G: 1 INJECTION INTRAMUSCULAR; INTRAVENOUS; SUBCUTANEOUS at 05:16

## 2020-07-25 RX ADMIN — FENTANYL CITRATE 50 MCG: 50 INJECTION, SOLUTION INTRAMUSCULAR; INTRAVENOUS at 18:28

## 2020-07-25 RX ADMIN — PROPOFOL 140 MG: 10 INJECTION, EMULSION INTRAVENOUS at 17:13

## 2020-07-25 RX ADMIN — KETOROLAC TROMETHAMINE 30 MG: 30 INJECTION, SOLUTION INTRAMUSCULAR at 11:46

## 2020-07-25 RX ADMIN — ONDANSETRON HYDROCHLORIDE 4 MG: 2 SOLUTION INTRAMUSCULAR; INTRAVENOUS at 17:19

## 2020-07-25 RX ADMIN — PANTOPRAZOLE SODIUM 40 MG: 40 INJECTION, POWDER, FOR SOLUTION INTRAVENOUS at 14:58

## 2020-07-25 NOTE — ED NOTES
TRANSFER - OUT REPORT:    Verbal report given to LUCIO [facility]) on Neeru   being transferred to 5th floor(unit) for routine progression of care       Report consisted of patients Situation, Background, Assessment and   Recommendations(SBAR). Information from the following report(s) SBAR, ED Summary, [facility] and Recent Results was reviewed with the receiving nurse. Lines:   Peripheral IV 07/25/20 Left Antecubital (Active)   Site Assessment Clean, dry, & intact 07/25/20 0354   Phlebitis Assessment 0 07/25/20 0354   Infiltration Assessment 0 07/25/20 0354   Dressing Status Clean, dry, & intact 07/25/20 0354   Dressing Type Tape;Transparent 07/25/20 0354   Hub Color/Line Status Pink;Flushed;Patent 07/25/20 0354   Action Taken Blood drawn 07/25/20 0354        Opportunity for questions and clarification was provided.       Patient transported with:   Tech/SIRIS

## 2020-07-25 NOTE — PROGRESS NOTES
Bedside shift change report given to La (oncoming nurse) by Sarahy Raymond (offgoing nurse). Report included the following information: SBAR, Kardex, and MAR.

## 2020-07-25 NOTE — CONSULTS
Surgery Consult    Subjective:      Tito Quevedo is a 54 y.o. female who presents with RUQ pain and epigastric pain for several days. She has known gallstones since December and has been following a low fat diet with success until now. No WBC and normal LFTs    US  FINDINGS:     The gallbladder is distended and contains an impacted stone at the neck with 2.3  mm borderline wall thickening and no identified pericholecystic fluid or other  abnormality. The patient is reported to have pain with direct insonation of the  gallbladder. The common bile duct measures 9.7 mm with 5 mm stone in the duct.       The liver is enlarged and diffusely echogenic with no focal lesion or  intrahepatic biliary ductal dilation. There is hepatopetal portal venous flow  within the liver.      The pancreas appears normal with no identified mass or ductal dilation.     The right kidney appears normal with no identified calculus, mass, or  hydronephrosis. Right renal length measures 13.7 cm.     IMPRESSION  IMPRESSION: Choledocholithiasis with 5 mm stone and 9.7 mm common bile duct  dilation. Cholecystolithiasis with distended gallbladder and tenderness on  insonation with borderline gallbladder wall thickness concerning for possible  cholecystitis. Hepatomegaly with increased hepatic echogenicity compatible with  diffuse hepatocellular process, most commonly seen in steatosis.       Past Medical History:   Diagnosis Date    Depression     after   2012    Panic disorder      Past Surgical History:   Procedure Laterality Date    HX  SECTION  29 yrs ago    [facility] HX  SECTION  32 years ago    HX GYN        Family History   Family history unknown: Yes     Social History     Socioeconomic History    Marital status:      Spouse name: Not on file    Number of children: Not on file    Years of education: Not on file    Highest education level: Not on file   Tobacco Use    Smoking status: Former Smoker    Smokeless tobacco: Never Used   Substance and Sexual Activity    Alcohol use: No     Alcohol/week: 0.0 standard drinks    Drug use: No    Sexual activity: Not Currently      Current Facility-Administered Medications   Medication Dose Route Frequency    sodium chloride (NS) flush 5-40 mL  5-40 mL IntraVENous Q8H    sodium chloride (NS) flush 5-40 mL  5-40 mL IntraVENous PRN    0.9% sodium chloride infusion  125 mL/hr IntraVENous CONTINUOUS    ketorolac (TORADOL) injection 15 mg  15 mg IntraVENous Q6H PRN    pantoprazole (PROTONIX) 40 mg in 0.9% sodium chloride 10 mL injection  40 mg IntraVENous DAILY    acetaminophen (TYLENOL) suppository 650 mg  650 mg Rectal Q4H PRN      No Known Allergies    Review of Systems:REVIEW OF SYSTEMS:     []     Unable to obtain  ROS due to  []    mental status change  []    sedated   []    intubated   [x]    Total of 12 systems reviewed as follows:    Constitutional: neg for fevers, chills, weight loss, malaise  Eyes: negative for blurry vision  Ears, nose, mouth, throat, and face: negative for sore throat  Respiratory: negative for SOB  Cardiovascular: negative for CP  Gastrointestinal: negative for , diarrhea, constipation, melena, hematochezia  Genitourinary: negative for dysuria  Integument/breast: neg for skin rash  Hematologic/lymphatic: neg for bruising  Musculoskeletal: negative for muscle aches  Neurological: no dizziness or h/a    Objective:        Patient Vitals for the past 8 hrs:   BP Temp Pulse Resp SpO2   20 1111 164/72 98.1 °F (36.7 °C) 68 17 96 %   20 0956 (!) 163/98 98.2 °F (36.8 °C) 76 16 100 %       Temp (24hrs), Av °F (36.7 °C), Min:97.2 °F (36.2 °C), Max:98.4 °F (36.9 °C)      Physical Exam:  General:  Alert, cooperative, no distress, appears stated age. Eyes:  Conjunctivae/corneas clear.     Nose: Nares normal. Septum midline   Mouth/Throat: Lips, mucosa, and tongue normal.    Neck: Supple, symmetrical, trachea midline   Lungs:   Clear to auscultation bilaterally. Heart:  Regular rate and rhythm   Abdomen:   Soft, tender RUQ and epigastric area, non-distended, no rebound, no guarding, normal bowel sounds   Extremities: Extremities normal, atraumatic, no cyanosis or edema. Skin: Skin color, texture, turgor normal. No rashes or lesions   Neuro: Alert, oriented, speech clear     Labs:   Recent Labs     07/25/20  0353   WBC 9.2   HGB 13.8   HCT 43.4   *     Recent Labs     07/25/20  0353      K 3.8      CO2 29   *   BUN 10   CREA 0.78   CA 9.7   ALB 4.0   TBILI 0.4   ALT 32     No results for input(s): INR, INREXT in the last 72 hours. Assessment and Plan:     53 y/o female with multiple medical problems who come with RUQ pain for 4 days. But has had issues since December. Still TTP  Explained cholecystectomy procedure along with all risks.     All questions answered         Signed By: Ayan Marr MD     July 25, 2020

## 2020-07-25 NOTE — ED NOTES
Pt resting on stretcher with lights dimmed for comfort. IVF infusing w/o difficulty. Call bell within reach after explanation for usual times to lab results.

## 2020-07-25 NOTE — ED NOTES
Pt has one hypotensive episode where pain suddenly increased and she became pale and diaphoretic. BP improved after positioning pt supine and pain decreased.

## 2020-07-25 NOTE — PROGRESS NOTES
Bedside and Verbal shift change report given to CARMEN Sol (oncoming nurse) by Ventura Toth RN (offgoing nurse). Report included the following information SBAR, Kardex and ED Summary.

## 2020-07-25 NOTE — OP NOTES
Patient: Yarely Bullard MRN: [id number]  SSN: [id number]    YOB: 1965  Age: 54 y.o. Sex: female        OPERATIVE REPORT - LAPAROSCOPIC CHOLECYSTECTOMY    PREOPERATIVE DIAGNOSIS: Symptomatic cholelithiasis. POSTOPERATIVE DIAGNOSIS: Cholelithiasis with cholecystitis. OPERATIVE PROCEDURE:  Laparoscopic cholecystectomy. SURGEON: Jasbir Reyes MD    ANESTHESIA: General.    ANESTHESIOLOGIST: General    COMPLICATIONS: None    ASSISTANT: None    ESTIMATED BLOOD LOSS: Minimal.    INDICATION: Documented in the history and physical.    FINDINGS: inflamed gallbladder, omental adhesions    PROCEDURE: Under general anesthesia and with the patient supine on the  operating table, the abdomen was cleaned, prepped, and draped. The  laparoscopic camera and CO2 insufflation apparatus were affixed to the  drapes in the usual fashion. Through a supraumbilical incision, a Veress needle was introduced and its  intraperitoneal position was confirmed and connected to CO2 insufflation. Pneumoperitoneum was created and maintained at 15 mmHg. The Veress needle  was removed, a 5-mm trocar introduced and laparoscopic camera through  this. A laparoscopy was performed and it was confirmed that there was no  intraabdominal injury during introduction of pneumoperitoneum and the  trocar. Under direct vision, two 5-mm ports were positioned, one at the right upper quadrant, and another in the right  flank. A 12mm port placed in epigastrium Patient was positioned in reverse Trendelenburg with a tilt to the  left. The fundus was grasped and lifted up towards the diaphragm. The  infundibulum was retracted laterally and inferiorly after releasing the  Adhesions carefully with cautery. The junction of the cystic duct and gallbladder was clearly identified, and  an adequate length of the cystic duct was dissected out. Similarly, the cystic  artery was also dissected out and an adequate length was displayed.  Critical view of Calot's triangle was obtained and the structures were clearly identified. After this was satisfactorily done, the cystic duct was doubly clipped on both sides and divided. The cystic artery was also doubly clipped on both sides and divided. The gallbladder was then gently dissected off from the liver bed  using electrocautery and achieving hemostasis. as the dissection proceeded  upwards towards the fundus. The gallbladder was thus removed from its bed. Using an Endobag through the epigastric port, the gallbladder  was removed intact. Irrigation of the gallbladder bed and the upper quadrant was performed until the returns were clear. No bleeding or bile leak was noted. The epigatsric port fascia was closed with 0 Vicryl using a Stortz closure device. The pneumoperitoneum was released and the ports were removed under vision. All wounds were  closed with subcuticular 4-0 Vicryl with Dermabond to the skin. SPECIMEN: Gallbladder. COUNTS: Correct at the completion of surgery.     Misa Terrazas MD

## 2020-07-25 NOTE — ED NOTES
Bedside and Verbal shift change report given to Jaclyn Cespedes (oncoming nurse) by [facility] (offgoing nurse). Report included the following information SBAR, ED Summary, MAR and Recent Results.

## 2020-07-25 NOTE — ED TRIAGE NOTES
Patient ambulatory to  ED treatment area with complaint of \"it started the day before yesterday but the pain only lasted a couple of hours. About an hour ago, the pain started again. \"

## 2020-07-25 NOTE — BRIEF OP NOTE
Brief Postoperative Note    Patient: Tayler Kirk  YOB: 1965  MRN: [id number]    Date of Procedure: 7/25/2020     Pre-Op Diagnosis: cholecystitis    Post-Op Diagnosis: Same as preoperative diagnosis.       Procedure(s):  CHOLECYSTECTOMY LAPAROSCOPIC    Surgeon(s):  Silvana Alvarez MD    Surgical Assistant: None    Anesthesia: General     Estimated Blood Loss (mL): Minimal    Complications: None    Specimens:   ID Type Source Tests Collected by Time Destination   1 : gallbladder Preservative Gallbladder  Silvana Alvarez MD 7/25/2020 8565 Pathology        Implants: * No implants in log *    Drains: * No LDAs found *    Findings: inflamed gallbladder, omental adhesions    Electronically Signed by Elena Wasserman MD on 7/25/2020 at 6:26 PM

## 2020-07-25 NOTE — ANESTHESIA PREPROCEDURE EVALUATION
Anesthetic History   No history of anesthetic complications            Review of Systems / Medical History  Patient summary reviewed and pertinent labs reviewed    Pulmonary  Within defined limits                 Neuro/Psych         Psychiatric history     Cardiovascular  Within defined limits                     GI/Hepatic/Renal               Comments: CBD stone Endo/Other  Within defined limits           Other Findings              Physical Exam    Airway  Mallampati: II    Neck ROM: normal range of motion   Mouth opening: Normal     Cardiovascular    Rhythm: regular  Rate: normal         Dental    Dentition: Lower dentition intact and Upper dentition intact     Pulmonary  Breath sounds clear to auscultation               Abdominal  GI exam deferred       Other Findings            Anesthetic Plan    ASA: 2  Anesthesia type: general          Induction: Intravenous  Anesthetic plan and risks discussed with: Patient

## 2020-07-25 NOTE — PROGRESS NOTES
[address] [facility] RESIDENCY PROGRAM  PROGRESS NOTE     7/26/2020  PCP: Alfonso Kirby MD     Assessment/Plan:     Hilaria Reyez is a 54 y.o. female with a PMHx of depression and anxiety/panic attacks and cholelithiasis who is admitted for choledocholithiasis with acute cholecystitis. Overnight Events: No acute events overnight. Patient underwent laparoscopic cholecystectomy yesterday. Choledocholithiasis with Acute Cholecystitis: Seen on abd US w/ 5mm stone and 9.7mm CBD dilt; borderline GB dist+thickening w/ ttp concerning for acute cholecystitis. S/p IV CTX in ED. POA AST elevated at 51 but ALT + Tbili wnl. GI consulted, but d/t stone at GB neck not amenable to ERCP, general surgery was consulted and pt underwent laparoscopic cholecystectomy 7/25. WBC, t bili, LFTs elevated postoperatively. - Zofran/Phenergan for nausea   - Toradol 15 mg IV q6h PRN, Oxycodone 5 mg PO q4h for moderate pain, Oxycodone 10 mg PO q4h for severe pain   - Daily CBC, CMP. Will repeat CMP today.     Hepatomegaly on US  Hepatomegaly w/ increased hepatic echogenicity - likely 2/2 hepatic steatosis  - F/U GI outpatient   - Daily CMP     Anxiety/Depression  Stable on home lexapro  - Hold lexapro while NPO        FEN/GI - Regular diet. Activity - Ambulate as tolerated  DVT prophylaxis - SCDs  GI prophylaxis - Protonix  Code Status - Full, discussed with patient  Next of [place] [address] Name and Contact Jordan Jackman (son)  - [phone]    Hilaria Reyez discussed with Dr. Yajaira Mcdowell. Subjective:   Pt was seen and examined at bedside. Afebrile and hemodynamically stable. She reports that she is feeling much better and her abdomen is just feeling sore postoperatively. Patient has not had a bowel movement and is unsure if she has passed gas since surgery. She denies any focal abdominal pain, nausea, vomiting, chest pain, SOB, fever, chills.      Objective:   Physical examination  Patient Vitals for the past 24 hrs:   Temp Pulse Resp BP SpO2   202 98.3 °F (36.8 °C) 83 20 146/81 94 %   20 97.9 °F (36.6 °C) 87 20 (!) 153/95 95 %   20 1930  84 20 137/79 94 %   20 1915 98.5 °F (36.9 °C) 84 22 134/81 96 %   20 1900  89 20 154/81 94 %   20 185  92 18 (!) 175/95 95 %   20 1850  92 18 (!) 163/17 94 %   20 1845 (!) 96.4 °F (35.8 °C) 88 22 142/82 96 %   20 1840    128/87 98 %   20 1838 (!) 96.3 °F (35.7 °C) 81 16 140/79 98 %   20 1111 98.1 °F (36.7 °C) 68 17 164/72 96 %   20 0956 98.2 °F (36.8 °C) 76 16 (!) 163/98 100 %   20 0800 98.4 °F (36.9 °C) 82 16 142/84 97 %   20 0745    129/77 98 %   20 0730    131/77 96 %   20 0715    133/68 97 %   20 0648     96 %   20 0647    154/72       Temp (24hrs), Av.8 °F (36.6 °C), Min:96.3 °F (35.7 °C), Max:98.5 °F (36.9 °C)         O2 Device: Room air    Date 20 - 20 - 20   Shift [id number] 24 Hour Total [id number] 8882-6913 24 Hour Total   INTAKE   P.O.  150 150        P. O.  150 150      Shift Total(mL/kg)  150(1.8) 150(1.8)      OUTPUT   Shift Total(mL/kg)         NET  150 150      Weight (kg) 84.2 84.2 84.2 84.2 84.2 84.2     General:   Alert, cooperative, no acute distress    Head:   Atraumatic   Eyes:   Conjunctivae clear   ENT:  Oral mucosa normal   Neck:  Supple, trachea midline   Lungs:   Clear to auscultation bilaterally, no wheezing/rales/rhonchi    Heart:   Normal rate, regular rhythm, no murmur, rubs or gallops   Abdomen:    Soft, non-tender, nondistended, 4 laparoscopic incision sites covered with glue (clean, dry, no bleeding)   No masses or organomegaly    Extremities:  No edema or DVT signs, intact peripheral pulses    Pulses:  Symmetric all extremities   Skin:  Warm and dry    No rashes or lesions   Neurologic:  Alert and oriented x4   No focal deficits     Data Review:     CBC:  Recent Labs 20  0116 20  0353   WBC 20.1* 9.2   HGB 13.8 13.8   HCT 41.2 43.4    783*     Metabolic Panel:  Recent Labs     20  0116 20  0353    139   K 3.7 3.8    102   CO2 25 29   BUN 10 10   CREA 0.66 0.78   * 137*   CA 8.7 9.7   ALB 3.3* 4.0   TBILI 2.5* 0.4   ALT 1,221* 32     Micro:  No results found for: CULT  [address] [place] [address]    Result Date: 2020  EXAM: Right upper quadrant limited abdominal ultrasound. CLINICAL INDICATION:  RUQ abdominal pain, nausea. . TECHNIQUE: High-resolution selected color flow evaluation of the right upper quadrant performed. COMPARISON:  None. FINDINGS: The gallbladder is distended and contains an impacted stone at the neck with 2.3 mm borderline wall thickening and no identified pericholecystic fluid or other abnormality. The patient is reported to have pain with direct insonation of the gallbladder. The common bile duct measures 9.7 mm with 5 mm stone in the duct. The liver is enlarged and diffusely echogenic with no focal lesion or intrahepatic biliary ductal dilation. There is hepatopetal portal venous flow within the liver. The pancreas appears normal with no identified mass or ductal dilation. The right kidney appears normal with no identified calculus, mass, or hydronephrosis. Right renal length measures 13.7 cm. IMPRESSION: Choledocholithiasis with 5 mm stone and 9.7 mm common bile duct dilation. Cholecystolithiasis with distended gallbladder and tenderness on insonation with borderline gallbladder wall thickness concerning for possible cholecystitis. Hepatomegaly with increased hepatic echogenicity compatible with diffuse hepatocellular process, most commonly seen in steatosis.      Medications reviewed  Current Facility-Administered Medications   Medication Dose Route Frequency    sodium chloride (NS) flush 5-40 mL  5-40 mL IntraVENous Q8H    sodium chloride (NS) flush 5-40 mL  5-40 mL IntraVENous PRN    ketorolac (TORADOL) injection 15 mg  15 mg IntraVENous Q6H PRN    pantoprazole (PROTONIX) 40 mg in 0.9% sodium chloride 10 mL injection  40 mg IntraVENous DAILY    acetaminophen (TYLENOL) suppository 650 mg  650 mg Rectal Q4H PRN    escitalopram oxalate (LEXAPRO) tablet 5 mg  5 mg Oral QPM    oxyCODONE IR (ROXICODONE) tablet 5 mg  5 mg Oral Q4H PRN    oxyCODONE IR (ROXICODONE) tablet 10 mg  10 mg Oral Q4H PRN    ondansetron (ZOFRAN) injection 6 mg  6 mg IntraVENous Q6H PRN         Signed:   Geo Ashley DO  Family Medicine Resident      Attending note: Attending note to follow. ..

## 2020-07-25 NOTE — H&P
[address] [place] [address]   Office [phone]  Fax [phone]       Admission H&P     Name: Zakia Vincent MRN: [id number]  Sex: Female   YOB: 1965  Age: 54 y.o. PCP: Eddie Hanson MD     Source of Information: patient, medical records    Chief complaint: Upper abdominal pain    History of Present Illness  Zakia Vincent is a 54 y.o. female with known hx depression/anxiety w/ panic attacks, cholelithiasis, who presented to the [facility] ER complaining of a 2 day hx of gradually worsening, intermittent upper abdominal pain. This started 2 days ago and lasted for a few hrs, but has significantly increased in frequency and intensity since 3AM this morning. Pt reports pain to be \"sharp/squeezing\" located across her upper abdomen, non-radiating, crescendo-decrescendo (max intensity 10/10). She has associated nausea but no vomiting. No change in urinary/bowel habits. No fevers/chills. She has been following a low fat diet since Dec'19 when she had a similar episode. +hx CS but otherwise no abdominal surgeries. She has not taken any meds for this episode of pain. COVID Questions:   Experiencing any of the following symptoms: fever, chills, cough, SOB, diarrhea, URI symptoms.  NONE  Any Sick contacts with fever, cough, diarrhea, SOB, URI symptoms. NO  Traveled out of state or out of country. NO  Lives w/ her grand daughters and has been working at an independent living facility but reports that they have zero COVID cases/PUIs. In the ER, vital signs were remarkable for BP 88/64 upon arrival that improved to normotensive after 1L NS bolus. HR/RR/Temp/SpO2 wnl. Labs were remarkable for ST elevated at 51 but ALT + Tbili wnl. Abd US showed choledocholithiasis w/ 5mm stone + CBD dilt at 9.7mm; GB dist + thickening concerning for acute cholecystitis. Pt was treated with 1LS bolus, 1x toradol + zofran, 1x IV CTX .      Home Medications   Prior to Admission medications    Medication Sig Start Date End Date Taking? Authorizing Provider   aspirin 81 mg chewable tablet Take 81 mg by mouth daily. Yes Other, MD Gavi   escitalopram oxalate (LEXAPRO) 10 mg tablet TAKE ONE TABLET BY MOUTH ONCE OVER 24 HOURS 18  Yes Estrellita Arango MD   ondansetron (ZOFRAN ODT) 4 mg disintegrating tablet Take 1 Tab by mouth every eight (8) hours as needed for Nausea. 19  Hue Colbert MD   aspirin (ASPIRIN) 325 mg tablet Take 325 mg by mouth daily. 20  Provider, Historical       Allergies  No Known Allergies    Past Medical History:   Diagnosis Date    Depression     after   2012    Panic disorder        Previous Hospitalization(s)  Past Surgical History:   Procedure Laterality Date    HX  SECTION  34 yrs ago     HX  SECTION  28 years ago    HX GYN         Family History   Family history unknown: Yes       Social History  Alcohol history: Not at all  Smoking history: Former smoker, smoked 1 ppd x 15 years, quit 20 years ago  Illicit drug history: Not at all    Living arrangement: patient lives with their family. Ambulates: Independently     Review of Systems  Review of Systems   Constitutional: Positive for diaphoresis. Negative for chills and fever. HENT: Negative for congestion and sore throat. Eyes: Negative for redness and visual disturbance. Respiratory: Negative for cough, chest tightness and shortness of breath. Cardiovascular: Negative for chest pain and leg swelling. Gastrointestinal: Positive for abdominal pain and nausea. Negative for abdominal distention, blood in stool, constipation, diarrhea and vomiting. Endocrine: Negative for cold intolerance and heat intolerance. Genitourinary: Negative for difficulty urinating and dysuria. Musculoskeletal: Negative for arthralgias and myalgias. Skin: Negative for color change and rash. Allergic/Immunologic: Negative for food allergies and immunocompromised state.    Neurological: Negative for dizziness and headaches. Hematological: Negative for adenopathy. Does not bruise/bleed easily. Psychiatric/Behavioral: Negative for agitation and suicidal ideas. The patient is nervous/anxious. Physical Exam  Objective:  General Appearance: In pain. Vital signs: (most recent): Blood pressure (!) 163/98, pulse 76, temperature 98.2 °F (36.8 °C), resp. rate 16, height 5' 10\" (1.778 m), weight 185 lb 10 oz (84.2 kg), SpO2 100 %. Vital signs are normal.  No fever. Output: Producing urine. HEENT: Normal HEENT exam.    Lungs:  Normal effort and normal respiratory rate. Heart: Normal rate. Regular rhythm. S1 normal and S2 normal.  No murmur, gallop or friction rub. Chest: Symmetric chest wall expansion. No chest wall tenderness. Abdomen: Abdomen is soft and non-distended. Bowel sounds are normal.   There is right upper quadrant and epigastric tenderness. There is no rebound tenderness. There is no guarding.  (+David sign). Extremities: Normal range of motion. There is no deformity or dependent edema. Pulses: Distal pulses are intact. Neurological: Patient is alert and oriented to person, place and time. Normal strength. Pupils:  Pupils are equal, round, and reactive to light. Skin:  Warm and dry. No rash. O2 Device: Room air     Laboratory Data  As reviewed in HPI    Imaging  Abdominal US  IMPRESSION: Choledocholithiasis with 5 mm stone and 9.7 mm common bile duct  dilation. Cholecystolithiasis with distended gallbladder and tenderness on  insonation with borderline gallbladder wall thickness concerning for possible  cholecystitis. Hepatomegaly with increased hepatic echogenicity compatible with  diffuse hepatocellular process, most commonly seen in steatosis.         Assessment and Plan     Kjenrique Calderónfoot is a 54 y.o. female w/ a hx dep/anxiety w/ panic attacks, cholelithiasis, who is admitted for choledocholithiasis/acute cholecystitis. Choledocholithiasis w/ concern for Acute Cholecystitis  Seen on abd US w/ 5mm stone and 9.7mm CBD dilt; borderline GB dist+thickening w/ ttp concerning for acute cholecystitis. S/p IV CTX although no current clinical signs of asc cholangitis/intra-abdominal infection/septic process requiring Abx. AST elevated at 51 but ALT + Tbili wnl  - Admit to med/surg  - GI c/s  - MIVF  - Pain control w/ toradol PRN; will give IV protonix every day to avoid risk of PUD  - NPO pending GI eval  - Strict I/Os  - Daily CBC, CMP    Hepatomegaly  Seen on US - likely 2/2 hepatic steatosis  - GI c/s  - Daily CMP    Anxiety/Depression  Stable on home lexapro  - Hold lexapro while NPO      FEN/GI - NPO. NS at 125 mL/hr. Activity - Ambulate as tolerated  DVT prophylaxis - SCDs  GI prophylaxis - Not indicated at this time  Fall prophylaxis - Not indicated at this time. Disposition - Admit to Medical. Plan to d/c to Home.    Code Status - Full, discussed with patient  Next of Kin Name and Contact Libby Long (son)  - [phone]    Patient Sri Salcedo will be discussed Dr. Jennings Kawasaki, Family Medicine Attending    10:13 AM, 07/25/20  Kenyatta Huggins MD  Family Medicine Resident

## 2020-07-25 NOTE — ED PROVIDER NOTES
Eugene Arnold is a 55 yo F with h/o gall stones who developed epigastric and RUQ abdominal pain about 1 hour ago. The pain comes in waves and when it occurs she feels lightheaded and nauseated. She denies fever or chills. She had similar symptoms yesterday but the pain was less severe and resolved in a couple of hours.              Past Medical History:   Diagnosis Date    Depression     after   2012    Panic disorder        Past Surgical History:   Procedure Laterality Date    HX  SECTION  34 yrs ago    Valri Juan HX  SECTION  28 years ago   Valri Juan HX GYN           Family History:   Family history unknown: Yes       Social History     Socioeconomic History    Marital status:      Spouse name: Not on file    Number of children: Not on file    Years of education: Not on file    Highest education level: Not on file   Occupational History    Not on file   Social Needs    Financial resource strain: Not on file    Food insecurity     Worry: Not on file     Inability: Not on file    Transportation needs     Medical: Not on file     Non-medical: Not on file   Tobacco Use    Smoking status: Former Smoker    Smokeless tobacco: Never Used   Substance and Sexual Activity    Alcohol use: No     Alcohol/week: 0.0 standard drinks    Drug use: No    Sexual activity: Not Currently   Lifestyle    Physical activity     Days per week: Not on file     Minutes per session: Not on file    Stress: Not on file   Relationships    Social connections     Talks on phone: Not on file     Gets together: Not on file     Attends Hindu service: Not on file     Active member of club or organization: Not on file     Attends meetings of clubs or organizations: Not on file     Relationship status: Not on file    Intimate partner violence     Fear of current or ex partner: Not on file     Emotionally abused: Not on file     Physically abused: Not on file     Forced sexual activity: Not on file   Other Topics Concern    Not on file   Social History Narrative    Not on file         ALLERGIES: Patient has no known allergies. Review of Systems   Constitutional: Negative for fever. HENT: Negative for sore throat. Eyes: Negative for visual disturbance. Respiratory: Negative for cough. Cardiovascular: Negative for chest pain. Gastrointestinal: Positive for abdominal pain, nausea and vomiting. Genitourinary: Negative for dysuria. Musculoskeletal: Negative for back pain. Skin: Negative for rash. Neurological: Negative for headaches. Vitals:    07/25/20 0345   BP: (!) 145/95   Pulse: 64   Resp: 16   Temp: 97.2 °F (36.2 °C)   SpO2: 100%   Weight: 84.2 kg (185 lb 10 oz)   Height: 5' 10\" (1.778 m)            Physical Exam  Vitals signs and nursing note reviewed. Constitutional:       General: She is not in acute distress. Appearance: She is well-developed. She is diaphoretic. HENT:      Head: Normocephalic and atraumatic. Eyes:      Conjunctiva/sclera: Conjunctivae normal.   Neck:      Musculoskeletal: Normal range of motion. Trachea: Phonation normal.   Cardiovascular:      Rate and Rhythm: Normal rate. Pulmonary:      Effort: Pulmonary effort is normal. No respiratory distress. Abdominal:      General: There is no distension. Tenderness: There is abdominal tenderness in the right upper quadrant and epigastric area. Musculoskeletal: Normal range of motion. General: No tenderness. Skin:     General: Skin is warm. Coloration: Skin is pale. Neurological:      Mental Status: She is alert. She is not disoriented. Motor: No abnormal muscle tone. MDM       4:10 AM  Patient reassessed and pain improved with Toradol and zofran. LAbs and US pending. Hospitalist Maximo Serve for Admission  5:07 AM    ED Room Number: Dillan Drew  Patient Name and age:  Filibertosigeovanna Martin 54 y.o.  female  Working Diagnosis:   1.  Choledocholithiasis        COVID-19 Suspicion:  no    Code Status:  Full Code  Readmission: no  Isolation Requirements:  no  Recommended Level of Care:  med/surg  Department:[facility] - [phone]  Other:  Patient with h/o gallstones. Acute pain this morning. US with 5mm stone and 9.7mm CBD dilatation. Borderline gallbladder wall thickness on US but patient afebrile and WBC normal.  I did order rocephin IV. Will need admit for GI/ERCP.     Procedures

## 2020-07-25 NOTE — CONSULTS
[facility]. Jodee Singer M.D.  [phone]                    GASTROENTEROLOGY CONSULTATION NOTE              NAME:  Bhumi Cano   :   1965   MRN:   [id number]       Referring Physician:    Dr. Kinsey Ware Date:   2020 2:55 PM    Chief Complaint:    Abdominal pain     History of Present Illness:    Patient is a 54 y.o. who presented to the ED with constant right epigastric and right upper abdominal pain. This started 2 days ago and lasted for a few hrsbut slowly became constant and increased in frequency and intensity since 3AM this morning. Pt reports pain to be \"sharp/squeezing\" located across her upper abdomen, non-radiating. She has associated nausea but no vomiting. US showed a small 5mm  Stone in the CBD without dilation of the duct  There was stone noted at the GB neck and GB wall thickening    GI has been consulted for evaluation and management of her abdominal pain    PMH:  Past Medical History:   Diagnosis Date    Depression     after   2012    Panic disorder        PSH:  Past Surgical History:   Procedure Laterality Date    HX  SECTION  34 yrs ago     HX  SECTION  32 years ago    HX GYN         Allergies:  No Known Allergies    Home Medications:  Prior to Admission Medications   Prescriptions Last Dose Informant Patient Reported? Taking?   aspirin 81 mg chewable tablet 2020 at Unknown time  Yes Yes   Sig: Take 81 mg by mouth daily.    escitalopram oxalate (LEXAPRO) 10 mg tablet 2020 at Unknown time  No Yes   Sig: TAKE ONE TABLET BY MOUTH ONCE OVER 24 HOURS      Facility-Administered Medications: None       Hospital Medications:  Current Facility-Administered Medications   Medication Dose Route Frequency    sodium chloride (NS) flush 5-40 mL  5-40 mL IntraVENous Q8H    sodium chloride (NS) flush 5-40 mL  5-40 mL IntraVENous PRN    0.9% sodium chloride infusion  125 mL/hr IntraVENous CONTINUOUS    ketorolac (TORADOL) injection 15 mg  15 mg IntraVENous Q6H PRN    pantoprazole (PROTONIX) 40 mg in 0.9% sodium chloride 10 mL injection  40 mg IntraVENous DAILY    acetaminophen (TYLENOL) suppository 650 mg  650 mg Rectal Q4H PRN       Social History:  Social History     Tobacco Use    Smoking status: Former Smoker    Smokeless tobacco: Never Used   Substance Use Topics    Alcohol use: No     Alcohol/week: 0.0 standard drinks       Family History:  Family History   Family history unknown: Yes       Review of Systems:  A complete 12-point ROS was obtained and is as per the above HPI otherwise negative       Objective:     Patient Vitals for the past 8 hrs:   BP Temp Pulse Resp SpO2   07/25/20 1111 164/72 98.1 °F (36.7 °C) 68 17 96 %   07/25/20 0956 (!) 163/98 98.2 °F (36.8 °C) 76 16 100 %   07/25/20 0800 142/84 98.4 °F (36.9 °C) 82 16 97 %   07/25/20 0745 129/77    98 %   07/25/20 0730 131/77    96 %   07/25/20 0715 133/68    97 %     No intake/output data recorded. 07/23 1901 - 07/25 0700  In: 1000 [I.V.:1000]  Out: -     EXAM:     NEURO-alert, oriented x3, affect appropriate, no focal neurological deficits, moves all extremities well, no involuntary movements, reflexes at knee and ankle intact   HEENT-Head: Normocephalic, no lesions, without obvious abnormality. LUNGS-clear to auscultation bilaterally    COR-regular rate and rhythym     ABD- abnormal findings:   tenderness moderate in the epigastrium and in the RUQ     EXT-no edema    Skin - No rash     Data Review     Recent Labs     07/25/20  0353   WBC 9.2   HGB 13.8   HCT 43.4   *     Recent Labs     07/25/20  0353      K 3.8      CO2 29   BUN 10   CREA 0.78   *   CA 9.7     Recent Labs     07/25/20  0353   AP 88   TP 8.1   ALB 4.0   GLOB 4.1*   LPSE 190     No results for input(s): INR, PTP, APTT, INREXT in the last 72 hours.     Patient Active Problem List   Diagnosis Code    Panic disorder F41.0    Anxiety F41.9    Choledocholithiasis K80.50    Cholecystitis K81.9    Hepatomegaly R16.0       Assessment and Plan:  Stone noted at the GB neck  Not amenable to ERCP  Recommend proceed with alicia with IOC  Will follow results  Continue supportive care as planned      Thanks for allowing me to participate in the care of this patient.   Signed By: Jase Dempsey MD     7/25/2020  2:55 PM

## 2020-07-25 NOTE — PERIOP NOTES
TRANSFER - OUT REPORT:    Verbal report given to [facility] RN on Nilsa Franklin  being transferred to  for routine post - op       Report consisted of patients Situation, Background, Assessment and   Recommendations(SBAR). Information from the following report(s) SBAR, Intake/Output and MAR was reviewed with the receiving nurse. Lines:   Peripheral IV 07/25/20 Left Antecubital (Active)   Site Assessment Clean, dry, & intact 07/25/20 1900   Phlebitis Assessment 0 07/25/20 1900   Infiltration Assessment 0 07/25/20 1900   Dressing Status Clean, dry, & intact 07/25/20 1900   Dressing Type Transparent;Tape 07/25/20 1616   Hub Color/Line Status Flushed 07/25/20 1616   Action Taken Open ports on tubing capped 07/25/20 1616   Alcohol Cap Used Yes 07/25/20 1616        Opportunity for questions and clarification was provided.       Patient transported with:   Registered Nurse

## 2020-07-25 NOTE — PROGRESS NOTES
Per call from [address] [place] [address], he spoke to GI, who recommended gen surg c/s based on the location of the stone (unable to resolve via ERCP).      Eduardo Velez MD  Family Medicine Resident

## 2020-07-25 NOTE — ANESTHESIA POSTPROCEDURE EVALUATION
Procedure(s):  CHOLECYSTECTOMY LAPAROSCOPIC. general    Anesthesia Post Evaluation      Multimodal analgesia: multimodal analgesia not used between 6 hours prior to anesthesia start to PACU discharge  Patient location during evaluation: PACU  Patient participation: complete - patient participated  Level of consciousness: awake  Pain management: adequate  Airway patency: patent  Anesthetic complications: no  Cardiovascular status: acceptable, blood pressure returned to baseline and hemodynamically stable  Respiratory status: acceptable  Hydration status: acceptable  Post anesthesia nausea and vomiting:  controlled  Final Post Anesthesia Temperature Assessment:  Normothermia (36.0-37.5 degrees C)      INITIAL Post-op Vital signs:   Vitals Value Taken Time   /81 7/25/2020  7:00 PM   Temp 35.8 °C (96.4 °F) 7/25/2020  6:45 PM   Pulse 83 7/25/2020  7:08 PM   Resp 21 7/25/2020  7:08 PM   SpO2 95 % 7/25/2020  7:08 PM   Vitals shown include unvalidated device data.

## 2020-07-26 LAB
ALBUMIN SERPL-MCNC: 3.1 G/DL (ref 3.5–5)
ALBUMIN SERPL-MCNC: 3.2 G/DL (ref 3.5–5)
ALBUMIN SERPL-MCNC: 3.3 G/DL (ref 3.5–5)
ALBUMIN/GLOB SERPL: 0.8 {RATIO} (ref 1.1–2.2)
ALP SERPL-CCNC: 242 U/L (ref 45–117)
ALP SERPL-CCNC: 245 U/L (ref 45–117)
ALP SERPL-CCNC: 258 U/L (ref 45–117)
ALT SERPL-CCNC: 1065 U/L (ref 12–78)
ALT SERPL-CCNC: 1221 U/L (ref 12–78)
ALT SERPL-CCNC: 974 U/L (ref 12–78)
ANION GAP SERPL CALC-SCNC: 3 MMOL/L (ref 5–15)
ANION GAP SERPL CALC-SCNC: 5 MMOL/L (ref 5–15)
ANION GAP SERPL CALC-SCNC: 5 MMOL/L (ref 5–15)
AST SERPL-CCNC: 1418 U/L (ref 15–37)
AST SERPL-CCNC: 735 U/L (ref 15–37)
AST SERPL-CCNC: 938 U/L (ref 15–37)
BASOPHILS # BLD: 0 K/UL (ref 0–0.1)
BASOPHILS NFR BLD: 0 % (ref 0–1)
BILIRUB SERPL-MCNC: 2.5 MG/DL (ref 0.2–1)
BILIRUB SERPL-MCNC: 3 MG/DL (ref 0.2–1)
BILIRUB SERPL-MCNC: 3.1 MG/DL (ref 0.2–1)
BUN SERPL-MCNC: 10 MG/DL (ref 6–20)
BUN SERPL-MCNC: 13 MG/DL (ref 6–20)
BUN SERPL-MCNC: 15 MG/DL (ref 6–20)
BUN/CREAT SERPL: 15 (ref 12–20)
BUN/CREAT SERPL: 19 (ref 12–20)
BUN/CREAT SERPL: 20 (ref 12–20)
CALCIUM SERPL-MCNC: 8.5 MG/DL (ref 8.5–10.1)
CALCIUM SERPL-MCNC: 8.7 MG/DL (ref 8.5–10.1)
CALCIUM SERPL-MCNC: 8.7 MG/DL (ref 8.5–10.1)
CHLORIDE SERPL-SCNC: 105 MMOL/L (ref 97–108)
CHLORIDE SERPL-SCNC: 105 MMOL/L (ref 97–108)
CHLORIDE SERPL-SCNC: 106 MMOL/L (ref 97–108)
CO2 SERPL-SCNC: 25 MMOL/L (ref 21–32)
CO2 SERPL-SCNC: 28 MMOL/L (ref 21–32)
CO2 SERPL-SCNC: 28 MMOL/L (ref 21–32)
COMMENT, HOLDF: NORMAL
CREAT SERPL-MCNC: 0.66 MG/DL (ref 0.55–1.02)
CREAT SERPL-MCNC: 0.66 MG/DL (ref 0.55–1.02)
CREAT SERPL-MCNC: 0.78 MG/DL (ref 0.55–1.02)
DIFFERENTIAL METHOD BLD: ABNORMAL
EOSINOPHIL # BLD: 0.1 K/UL (ref 0–0.4)
EOSINOPHIL NFR BLD: 1 % (ref 0–7)
ERYTHROCYTE [DISTWIDTH] IN BLOOD BY AUTOMATED COUNT: 12.1 % (ref 11.5–14.5)
ERYTHROCYTE [DISTWIDTH] IN BLOOD BY AUTOMATED COUNT: 12.5 % (ref 11.5–14.5)
GLOBULIN SER CALC-MCNC: 3.8 G/DL (ref 2–4)
GLOBULIN SER CALC-MCNC: 4 G/DL (ref 2–4)
GLOBULIN SER CALC-MCNC: 4.1 G/DL (ref 2–4)
GLUCOSE SERPL-MCNC: 107 MG/DL (ref 65–100)
GLUCOSE SERPL-MCNC: 110 MG/DL (ref 65–100)
GLUCOSE SERPL-MCNC: 126 MG/DL (ref 65–100)
HCT VFR BLD AUTO: 38.1 % (ref 35–47)
HCT VFR BLD AUTO: 41.2 % (ref 35–47)
HGB BLD-MCNC: 12.8 G/DL (ref 11.5–16)
HGB BLD-MCNC: 13.8 G/DL (ref 11.5–16)
IMM GRANULOCYTES # BLD AUTO: 0 K/UL (ref 0–0.04)
IMM GRANULOCYTES NFR BLD AUTO: 0 % (ref 0–0.5)
LYMPHOCYTES # BLD: 1 K/UL (ref 0.8–3.5)
LYMPHOCYTES NFR BLD: 8 % (ref 12–49)
MCH RBC QN AUTO: 31.1 PG (ref 26–34)
MCH RBC QN AUTO: 31.2 PG (ref 26–34)
MCHC RBC AUTO-ENTMCNC: 33.5 G/DL (ref 30–36.5)
MCHC RBC AUTO-ENTMCNC: 33.6 G/DL (ref 30–36.5)
MCV RBC AUTO: 92.5 FL (ref 80–99)
MCV RBC AUTO: 93.2 FL (ref 80–99)
MONOCYTES # BLD: 0.6 K/UL (ref 0–1)
MONOCYTES NFR BLD: 5 % (ref 5–13)
NEUTS SEG # BLD: 10.4 K/UL (ref 1.8–8)
NEUTS SEG NFR BLD: 86 % (ref 32–75)
NRBC # BLD: 0 K/UL (ref 0–0.01)
NRBC # BLD: 0 K/UL (ref 0–0.01)
NRBC BLD-RTO: 0 PER 100 WBC
NRBC BLD-RTO: 0 PER 100 WBC
PLATELET # BLD AUTO: 286 K/UL (ref 150–400)
PLATELET # BLD AUTO: 340 K/UL (ref 150–400)
PMV BLD AUTO: 10.1 FL (ref 8.9–12.9)
PMV BLD AUTO: 10.1 FL (ref 8.9–12.9)
POTASSIUM SERPL-SCNC: 3.7 MMOL/L (ref 3.5–5.1)
POTASSIUM SERPL-SCNC: 3.7 MMOL/L (ref 3.5–5.1)
POTASSIUM SERPL-SCNC: 3.8 MMOL/L (ref 3.5–5.1)
PROT SERPL-MCNC: 7 G/DL (ref 6.4–8.2)
PROT SERPL-MCNC: 7.1 G/DL (ref 6.4–8.2)
PROT SERPL-MCNC: 7.4 G/DL (ref 6.4–8.2)
RBC # BLD AUTO: 4.12 M/UL (ref 3.8–5.2)
RBC # BLD AUTO: 4.42 M/UL (ref 3.8–5.2)
SAMPLES BEING HELD,HOLD: NORMAL
SARS-COV-2, COV2: ABNORMAL
SODIUM SERPL-SCNC: 136 MMOL/L (ref 136–145)
SODIUM SERPL-SCNC: 136 MMOL/L (ref 136–145)
SODIUM SERPL-SCNC: 138 MMOL/L (ref 136–145)
SPECIMEN SOURCE, FCOV2M: ABNORMAL
WBC # BLD AUTO: 12.2 K/UL (ref 3.6–11)
WBC # BLD AUTO: 20.1 K/UL (ref 3.6–11)

## 2020-07-26 PROCEDURE — 65270000029 HC RM PRIVATE

## 2020-07-26 PROCEDURE — 77030027138 HC INCENT SPIROMETER -A

## 2020-07-26 PROCEDURE — 36415 COLL VENOUS BLD VENIPUNCTURE: CPT

## 2020-07-26 PROCEDURE — 74011250637 HC RX REV CODE- 250/637: Performed by: STUDENT IN AN ORGANIZED HEALTH CARE EDUCATION/TRAINING PROGRAM

## 2020-07-26 PROCEDURE — 85027 COMPLETE CBC AUTOMATED: CPT

## 2020-07-26 PROCEDURE — 74011250637 HC RX REV CODE- 250/637: Performed by: SURGERY

## 2020-07-26 PROCEDURE — 80053 COMPREHEN METABOLIC PANEL: CPT

## 2020-07-26 PROCEDURE — C9113 INJ PANTOPRAZOLE SODIUM, VIA: HCPCS | Performed by: STUDENT IN AN ORGANIZED HEALTH CARE EDUCATION/TRAINING PROGRAM

## 2020-07-26 PROCEDURE — 85025 COMPLETE CBC W/AUTO DIFF WBC: CPT

## 2020-07-26 PROCEDURE — 74011250636 HC RX REV CODE- 250/636: Performed by: STUDENT IN AN ORGANIZED HEALTH CARE EDUCATION/TRAINING PROGRAM

## 2020-07-26 RX ADMIN — ESCITALOPRAM OXALATE 5 MG: 10 TABLET ORAL at 17:52

## 2020-07-26 RX ADMIN — Medication 10 ML: at 14:01

## 2020-07-26 RX ADMIN — KETOROLAC TROMETHAMINE 15 MG: 30 INJECTION, SOLUTION INTRAMUSCULAR at 08:25

## 2020-07-26 RX ADMIN — OXYCODONE 5 MG: 5 TABLET ORAL at 21:35

## 2020-07-26 RX ADMIN — PANTOPRAZOLE SODIUM 40 MG: 40 INJECTION, POWDER, FOR SOLUTION INTRAVENOUS at 08:26

## 2020-07-26 RX ADMIN — Medication 10 ML: at 21:34

## 2020-07-26 RX ADMIN — OXYCODONE 5 MG: 5 TABLET ORAL at 01:04

## 2020-07-26 RX ADMIN — KETOROLAC TROMETHAMINE 15 MG: 30 INJECTION, SOLUTION INTRAMUSCULAR at 13:50

## 2020-07-26 NOTE — PROGRESS NOTES
[address] [facility] [place] RESIDENCY PROGRAM  PROGRESS NOTE     7/27/2020  PCP: Jeanie Franco MD     Assessment/Plan:     Ria Yang is a 54 y.o. female with a PMHx of depression and anxiety/panic attacks and cholelithiasis who is admitted for choledocholithiasis with acute cholecystitis. Overnight Events: No acute events overnight. Choledocholithiasis with Acute Cholecystitis: Seen on abd US w/ 5mm stone and 9.7mm CBD dilt; borderline GB dist+thickening. S/p IV CTX in ED. POA AST elevated at 51 but ALT + Tbili wnl. GI consulted, but d/t stone at GB neck not amenable to ERCP, general surgery was consulted and pt underwent laparoscopic cholecystectomy 7/25. WBC, t bili, LFTs elevated postoperatively. Leukocytosis is improving, LFTs trending down, and t bili elevated but stable. - Zofran for nausea   - Toradol 15 mg IV q6h PRN, Oxycodone 5 mg PO q4h for moderate pain, Oxycodone 10 mg PO q4h for severe pain   - Daily CBC, CMP     Hepatomegaly on US  Hepatomegaly w/ increased hepatic echogenicity - likely 2/2 hepatic steatosis  - F/U GI outpatient   - Daily CMP     Anxiety/Depression: Stable on home lexapro  - Continue Lexapro 5 mg        FEN/GI - Regular diet. Activity - Ambulate as tolerated  DVT prophylaxis - SCDs  GI prophylaxis - Protonix  Code Status - Full, discussed with patient  Next of [facility] Name and Contact Jennifer Franklin (son)  - [phone]    Ria Yang discussed with Dr. Ghislaine Duggan. Subjective:   Pt was seen and examined at bedside. Afebrile and hemodynamically stable. She reports that her surgical sites are sore when standing and she has some abdominal cramping which she attributes to gas. Patient also notes that her urine has been dark in color. She otherwise states that she is feeling much better and would like to go home. She denies any fever, chills, nausea, vomiting, CP, or SOB.     Objective:   Physical examination  Patient Vitals for the past 24 hrs:   Temp Pulse Resp BP SpO2 20 0801 98.6 °F (37 °C) 73 17 126/82 95 %   20 0520 98.1 °F (36.7 °C) 74 18 135/79 95 %   20 2225 99.4 °F (37.4 °C) 67 18 136/77 94 %   20 1922 98.9 °F (37.2 °C) 76 18 122/76 95 %   20 1553 98.4 °F (36.9 °C) 62 18 113/73 96 %   20 1216 97.8 °F (36.6 °C) (!) 53 18 108/71 97 %      Temp (24hrs), Av.5 °F (36.9 °C), Min:97.8 °F (36.6 °C), Max:99.4 °F (37.4 °C)         O2 Device: Room air    Date 20 - 20 0659 20 - 20 0659   Shift 9688-5199 2836-7489 24 Hour Total 5623-1315 9220-7130 24 Hour Total   INTAKE   P.O. 360  360        P. O. 360  360      Shift Total(mL/kg) 360(4.2)  360(4.2)      OUTPUT   Urine(mL/kg/hr) 1(0)  1(0)        Urine Voided 1  1        Urine Occurrence(s) 1 x  1 x      Shift Total(mL/kg) 1(0)  1(0)        359      Weight (kg) 86.6 86.6 86.6 86.6 86.6 86.6     General:   Alert, cooperative, no acute distress, resting comfortably in bed    Head:   Atraumatic   Eyes:   Conjunctivae clear   ENT:  Oral mucosa normal   Neck:  Supple, trachea midline   Lungs:   Clear to auscultation bilaterally, no wheezing/rales/rhonchi    Heart:   Normal rate, regular rhythm, no murmur, rubs or gallops   Abdomen:    Soft, non-tender, nondistended, 4 laparoscopic incision sites covered with glue (clean, dry, no bleeding)   No masses or organomegaly    Extremities:  No edema or calf tenderness, intact peripheral pulses    Pulses:  Symmetric all extremities   Skin:  Warm and dry    No rashes or lesions   Neurologic:  Alert and oriented x4   No focal deficits     Data Review:     CBC:  Recent Labs     20  0253 20  1544 20  0116   WBC 9.0 12.2* 20.1*   HGB 13.3 12.8 13.8   HCT 40.1 38.1 41.2    312 347     Metabolic Panel:  Recent Labs     20  0253 20  1544 20  0930    136 138   K 3.6 3.7 3.8    105 105   CO2 29 28 28   BUN 11 15 13   CREA 0.75 0.78 0.66   GLU 98 110* 107*   CA 8.7 8.5 8.7 ALB 3.3* 3.1* 3.2*   TBILI 3.2* 3.1* 3.0*   * 974* 1,065*     Micro:  No results found for: [facility]  [employer] [place] [address]    Result Date: 2020  EXAM: Right upper quadrant limited abdominal ultrasound. CLINICAL INDICATION:  RUQ abdominal pain, nausea. . TECHNIQUE: High-resolution selected color flow evaluation of the right upper quadrant performed. COMPARISON:  None. FINDINGS: The gallbladder is distended and contains an impacted stone at the neck with 2.3 mm borderline wall thickening and no identified pericholecystic fluid or other abnormality. The patient is reported to have pain with direct insonation of the gallbladder. The common bile duct measures 9.7 mm with 5 mm stone in the duct. The liver is enlarged and diffusely echogenic with no focal lesion or intrahepatic biliary ductal dilation. There is hepatopetal portal venous flow within the liver. The pancreas appears normal with no identified mass or ductal dilation. The right kidney appears normal with no identified calculus, mass, or hydronephrosis. Right renal length measures 13.7 cm. IMPRESSION: Choledocholithiasis with 5 mm stone and 9.7 mm common bile duct dilation. Cholecystolithiasis with distended gallbladder and tenderness on insonation with borderline gallbladder wall thickness concerning for possible cholecystitis. Hepatomegaly with increased hepatic echogenicity compatible with diffuse hepatocellular process, most commonly seen in steatosis.      Medications reviewed  Current Facility-Administered Medications   Medication Dose Route Frequency    senna-docusate (PERICOLACE) 8.6-50 mg per tablet 1 Tab  1 Tab Oral DAILY    sodium chloride (NS) flush 5-40 mL  5-40 mL IntraVENous Q8H    sodium chloride (NS) flush 5-40 mL  5-40 mL IntraVENous PRN    ketorolac (TORADOL) injection 15 mg  15 mg IntraVENous Q6H PRN    pantoprazole (PROTONIX) 40 mg in 0.9% sodium chloride 10 mL injection  40 mg IntraVENous DAILY    acetaminophen (TYLENOL) suppository 650 mg  650 mg Rectal Q4H PRN    escitalopram oxalate (LEXAPRO) tablet 5 mg  5 mg Oral QPM    oxyCODONE IR (ROXICODONE) tablet 5 mg  5 mg Oral Q4H PRN    oxyCODONE IR (ROXICODONE) tablet 10 mg  10 mg Oral Q4H PRN    ondansetron (ZOFRAN) injection 6 mg  6 mg IntraVENous Q6H PRN         Signed:   Geo Ashley DO  Family Medicine Resident      Attending note: Attending note to follow. ..

## 2020-07-26 NOTE — PROGRESS NOTES
[address] [place] [address] with [address] [place] [address]     Resident Progress Note in Brief    S:   Patient seen and examined at bedside. Patient is awake and alert, doing well with no concerns, tolerating sips of water  Pain is well controlled  Denies cp/sob/n/v       O:  Visit Vitals  BP (!) 153/95 (BP 1 Location: Right arm, BP Patient Position: At rest)   Pulse 87   Temp 97.9 °F (36.6 °C)   Resp 20   Ht 5' 10\" (1.778 m)   Wt 185 lb 10 oz (84.2 kg)   SpO2 95%   BMI 26.63 kg/m²       Physical Examination  General appearance - alert, well appearing, and in no distress. Chest - clear to auscultation, no wheezes, rales or rhonchi, symmetric air entry  Heart - normal rate, regular rhythm, normal S1, S2, no murmurs, rubs, clicks or gallops,   Abdomen - soft, nondistended, no masses or organomegaly. 4 laparoscopic incision sites clean, dry, closed, no bleeding. Neurological - alert, oriented, normal speech, no focal findings  Extremities - peripheral pulses normal, no pedal edema, no clubbing or cyanosis    A/P:   Lamin Carlson is a 54 y.o. female w/ a hx dep/anxiety w/ panic attacks, cholelithiasis, who is admitted for choledocholithiasis/acute cholecystitis, s/p cholecystectomy.     Choledocholithiasis w/ concern for Acute Cholecystitis: S/p cholecystectomy 7/25. POD0. Recovering well w/o concerns. Pain well controlled. Adm d/t Abd US w/ 5mm stone and 9.7mm CBD dilt; borderline GB dist+thickening w/ ttp concerning for acute cholecystitis. - Monitor I/O, flatus, BM  - Regular diet  - Routine labs  - Has PRN pain meds    Please see daily progress note for detailed plan.      Ally Patrick MD  Family Medicine Resident

## 2020-07-26 NOTE — PROGRESS NOTES
Dr. Bailey Charles asked RN repeat CBC and CMP in the morning. Blood work orders already have been placed as daily. Bedside and Verbal shift change report given to [place] (oncoming nurse) by Nancy Ceja RN (offgoing nurse). Report included the following information SBAR, Kardex and Quality Measures.

## 2020-07-26 NOTE — PROGRESS NOTES
General Surgery Daily Progress Note    Patient: Aundrea Ashby MRN: [id number]  SSN: [id number]    YOB: 1965  Age: 54 y.o. Sex: female      Admit Date: 7/25/2020    POD 1 Day Post-Op    Procedure: Procedure(s):  CHOLECYSTECTOMY LAPAROSCOPIC    Subjective:   Pain well controlled  Denies N/V    Current Facility-Administered Medications   Medication Dose Route Frequency    sodium chloride (NS) flush 5-40 mL  5-40 mL IntraVENous Q8H    sodium chloride (NS) flush 5-40 mL  5-40 mL IntraVENous PRN    ketorolac (TORADOL) injection 15 mg  15 mg IntraVENous Q6H PRN    pantoprazole (PROTONIX) 40 mg in 0.9% sodium chloride 10 mL injection  40 mg IntraVENous DAILY    acetaminophen (TYLENOL) suppository 650 mg  650 mg Rectal Q4H PRN    escitalopram oxalate (LEXAPRO) tablet 5 mg  5 mg Oral QPM    oxyCODONE IR (ROXICODONE) tablet 5 mg  5 mg Oral Q4H PRN    oxyCODONE IR (ROXICODONE) tablet 10 mg  10 mg Oral Q4H PRN    ondansetron (ZOFRAN) injection 6 mg  6 mg IntraVENous Q6H PRN        Objective:   No intake/output data recorded. 07/24 1901 - 07/26 0700  In: 1150 [P.O.:150; I.V.:1000]  Out: -   Patient Vitals for the past 8 hrs:   BP Temp Pulse Resp SpO2 Weight   07/26/20 0741 117/75 98.1 °F (36.7 °C) (!) 58 18 95 %    07/26/20 0537 128/79 98.3 °F (36.8 °C) 64 20 95 % 86.6 kg (190 lb 14.7 oz)       Physical Exam:  General: Alert, cooperative, NAD  Lungs: Unlabored  Abdomen: Soft, appropriately tender, non-distended. Incisions C/D/I. Joe Rein Extremities: Warm, moves all, no edema  Skin:  Warm and dry, no rash    Labs:   Recent Labs     07/26/20  0116   WBC 20.1*   HGB 13.8   HCT 41.2        Recent Labs     07/26/20 0116      K 3.7      CO2 25   *   BUN 10   CREA 0.66   CA 8.7   ALB 3.3*   TBILI 2.5*   ALT 1,221*       Assessment / Plan:     POD 1 Day Post-Op    Procedure: Procedure(s):  CHOLECYSTECTOMY LAPAROSCOPIC    Patient doing well  LFTs elevated, not unexpected due to surgery. Repeat labs 8 hrs after the AM ones done at 1AM  If ok then ok to go home

## 2020-07-26 NOTE — PROGRESS NOTES
Problem: Falls - Risk of  Goal: *Absence of Falls  Description: Document Truman Day Fall Risk and appropriate interventions in the flowsheet.   Outcome: Progressing Towards Goal  Note: Fall Risk Interventions:            Medication Interventions: Teach patient to arise slowly                   Problem: Patient Education: Go to Patient Education Activity  Goal: Patient/Family Education  Outcome: Progressing Towards Goal

## 2020-07-27 ENCOUNTER — APPOINTMENT (OUTPATIENT)
Dept: MRI IMAGING | Age: 55
DRG: 419 | End: 2020-07-27
Attending: SURGERY
Payer: COMMERCIAL

## 2020-07-27 VITALS
SYSTOLIC BLOOD PRESSURE: 129 MMHG | WEIGHT: 190.92 LBS | TEMPERATURE: 98.7 F | HEIGHT: 70 IN | OXYGEN SATURATION: 95 % | BODY MASS INDEX: 27.33 KG/M2 | DIASTOLIC BLOOD PRESSURE: 81 MMHG | RESPIRATION RATE: 18 BRPM | HEART RATE: 77 BPM

## 2020-07-27 LAB
ALBUMIN SERPL-MCNC: 3.3 G/DL (ref 3.5–5)
ALBUMIN/GLOB SERPL: 0.8 {RATIO} (ref 1.1–2.2)
ALP SERPL-CCNC: 294 U/L (ref 45–117)
ALT SERPL-CCNC: 835 U/L (ref 12–78)
ANION GAP SERPL CALC-SCNC: 3 MMOL/L (ref 5–15)
AST SERPL-CCNC: 495 U/L (ref 15–37)
ATRIAL RATE: 72 BPM
BILIRUB SERPL-MCNC: 3.2 MG/DL (ref 0.2–1)
BUN SERPL-MCNC: 11 MG/DL (ref 6–20)
BUN/CREAT SERPL: 15 (ref 12–20)
CALCIUM SERPL-MCNC: 8.7 MG/DL (ref 8.5–10.1)
CALCULATED P AXIS, ECG09: 50 DEGREES
CALCULATED R AXIS, ECG10: -37 DEGREES
CALCULATED T AXIS, ECG11: 51 DEGREES
CHLORIDE SERPL-SCNC: 105 MMOL/L (ref 97–108)
CO2 SERPL-SCNC: 29 MMOL/L (ref 21–32)
CREAT SERPL-MCNC: 0.75 MG/DL (ref 0.55–1.02)
DIAGNOSIS, 93000: NORMAL
ERYTHROCYTE [DISTWIDTH] IN BLOOD BY AUTOMATED COUNT: 12.3 % (ref 11.5–14.5)
GLOBULIN SER CALC-MCNC: 4.1 G/DL (ref 2–4)
GLUCOSE SERPL-MCNC: 98 MG/DL (ref 65–100)
HCT VFR BLD AUTO: 40.1 % (ref 35–47)
HGB BLD-MCNC: 13.3 G/DL (ref 11.5–16)
MCH RBC QN AUTO: 31.1 PG (ref 26–34)
MCHC RBC AUTO-ENTMCNC: 33.2 G/DL (ref 30–36.5)
MCV RBC AUTO: 93.9 FL (ref 80–99)
NRBC # BLD: 0 K/UL (ref 0–0.01)
NRBC BLD-RTO: 0 PER 100 WBC
P-R INTERVAL, ECG05: 170 MS
PLATELET # BLD AUTO: 290 K/UL (ref 150–400)
PMV BLD AUTO: 10.1 FL (ref 8.9–12.9)
POTASSIUM SERPL-SCNC: 3.6 MMOL/L (ref 3.5–5.1)
PROT SERPL-MCNC: 7.4 G/DL (ref 6.4–8.2)
Q-T INTERVAL, ECG07: 422 MS
QRS DURATION, ECG06: 90 MS
QTC CALCULATION (BEZET), ECG08: 462 MS
RBC # BLD AUTO: 4.27 M/UL (ref 3.8–5.2)
SODIUM SERPL-SCNC: 137 MMOL/L (ref 136–145)
VENTRICULAR RATE, ECG03: 72 BPM
WBC # BLD AUTO: 9 K/UL (ref 3.6–11)

## 2020-07-27 PROCEDURE — 74011250636 HC RX REV CODE- 250/636: Performed by: SURGERY

## 2020-07-27 PROCEDURE — 85027 COMPLETE CBC AUTOMATED: CPT

## 2020-07-27 PROCEDURE — 74011250636 HC RX REV CODE- 250/636: Performed by: STUDENT IN AN ORGANIZED HEALTH CARE EDUCATION/TRAINING PROGRAM

## 2020-07-27 PROCEDURE — A9585 GADOBUTROL INJECTION: HCPCS | Performed by: RADIOLOGY

## 2020-07-27 PROCEDURE — 74011250636 HC RX REV CODE- 250/636: Performed by: RADIOLOGY

## 2020-07-27 PROCEDURE — 36415 COLL VENOUS BLD VENIPUNCTURE: CPT

## 2020-07-27 PROCEDURE — 80053 COMPREHEN METABOLIC PANEL: CPT

## 2020-07-27 PROCEDURE — C9113 INJ PANTOPRAZOLE SODIUM, VIA: HCPCS | Performed by: STUDENT IN AN ORGANIZED HEALTH CARE EDUCATION/TRAINING PROGRAM

## 2020-07-27 PROCEDURE — 74011250637 HC RX REV CODE- 250/637: Performed by: STUDENT IN AN ORGANIZED HEALTH CARE EDUCATION/TRAINING PROGRAM

## 2020-07-27 PROCEDURE — 74183 MRI ABD W/O CNTR FLWD CNTR: CPT

## 2020-07-27 RX ORDER — AMOXICILLIN 250 MG
1 CAPSULE ORAL DAILY
Status: DISCONTINUED | OUTPATIENT
Start: 2020-07-27 | End: 2020-07-27 | Stop reason: HOSPADM

## 2020-07-27 RX ORDER — ESCITALOPRAM OXALATE 5 MG/1
5 TABLET ORAL EVERY EVENING
Qty: 30 TAB | Refills: 0 | Status: SHIPPED | OUTPATIENT
Start: 2020-07-27 | End: 2020-07-27

## 2020-07-27 RX ADMIN — KETOROLAC TROMETHAMINE 15 MG: 30 INJECTION, SOLUTION INTRAMUSCULAR at 15:41

## 2020-07-27 RX ADMIN — ESCITALOPRAM OXALATE 5 MG: 10 TABLET ORAL at 17:32

## 2020-07-27 RX ADMIN — KETOROLAC TROMETHAMINE 15 MG: 30 INJECTION, SOLUTION INTRAMUSCULAR at 07:51

## 2020-07-27 RX ADMIN — PANTOPRAZOLE SODIUM 40 MG: 40 INJECTION, POWDER, FOR SOLUTION INTRAVENOUS at 07:52

## 2020-07-27 RX ADMIN — GADOBUTROL 9 ML: 604.72 INJECTION INTRAVENOUS at 14:31

## 2020-07-27 RX ADMIN — ONDANSETRON 6 MG: 2 INJECTION INTRAMUSCULAR; INTRAVENOUS at 13:01

## 2020-07-27 RX ADMIN — Medication 10 ML: at 13:01

## 2020-07-27 NOTE — PROGRESS NOTES
General Surgery Daily Progress Note    Patient: Pinky Baez MRN: [id number]  SSN: [id number]    YOB: 1965  Age: 54 y.o. Sex: female      Admit Date: 7/25/2020    POD 2 Days Post-Op    Procedure: Procedure(s):  CHOLECYSTECTOMY LAPAROSCOPIC    Subjective:   Reports slight soreness by incisions but otherwise doing well  Denies any N/V    Current Facility-Administered Medications   Medication Dose Route Frequency    senna-docusate (PERICOLACE) 8.6-50 mg per tablet 1 Tab  1 Tab Oral DAILY    sodium chloride (NS) flush 5-40 mL  5-40 mL IntraVENous Q8H    sodium chloride (NS) flush 5-40 mL  5-40 mL IntraVENous PRN    ketorolac (TORADOL) injection 15 mg  15 mg IntraVENous Q6H PRN    pantoprazole (PROTONIX) 40 mg in 0.9% sodium chloride 10 mL injection  40 mg IntraVENous DAILY    acetaminophen (TYLENOL) suppository 650 mg  650 mg Rectal Q4H PRN    escitalopram oxalate (LEXAPRO) tablet 5 mg  5 mg Oral QPM    oxyCODONE IR (ROXICODONE) tablet 5 mg  5 mg Oral Q4H PRN    oxyCODONE IR (ROXICODONE) tablet 10 mg  10 mg Oral Q4H PRN    ondansetron (ZOFRAN) injection 6 mg  6 mg IntraVENous Q6H PRN        Objective:   No intake/output data recorded. 07/25 1901 - 07/27 0700  In: 510 [P.O.:510]  Out: 1 [Urine:1]  Patient Vitals for the past 8 hrs:   BP Temp Pulse Resp SpO2   07/27/20 0801 126/82 98.6 °F (37 °C) 73 17 95 %   07/27/20 0520 135/79 98.1 °F (36.7 °C) 74 18 95 %       Physical Exam:  General: Alert, cooperative, NAD  Lungs: Unlabored  Abdomen: Soft, appropriately tender, non-distended. Incisions C/D/I. Trude Roers    Extremities: Warm, moves all, no edema  Skin:  Warm and dry, no rash    Labs:   Recent Labs     07/27/20  0253   WBC 9.0   HGB 13.3   HCT 40.1        Recent Labs     07/27/20  0253      K 3.6      CO2 29   GLU 98   BUN 11   CREA 0.75   CA 8.7   ALB 3.3*   TBILI 3.2*   *       Assessment / Plan:     POD 2 Days Post-Op    Procedure: Procedure(s):  CHOLECYSTECTOMY LAPAROSCOPIC    Patient doing well  TBil continues to rise and is up to 3.2 today  The rest of LFTs had rise after cholecystectomy and have been coming down  WBC is now normal  Denies any N/V  Likely stone obstructing duct  Ordered MRCP and spoke with GI  Explained to patient and all questions answered  NPO for now

## 2020-07-27 NOTE — PROGRESS NOTES
CMS Note  7/27/2020    Due patient's isolation contact, CMS verbally discussed the Observation letter over the phone. Patient was given a copy for their record.   Devante Dudley CMS

## 2020-07-27 NOTE — PROGRESS NOTES
7/27/2020  11:52 AM  CM completed assessment w/ pt in person, CM and pt wore masks at all times  Charted demographics verified, pt lives w/ 15, 16 and 22 yo granddaughters in  apt, at baseline pt ia ambulatory, iADLs, drives and is employed. Reason for Admission:   Choledocholithiasis,  LAp Jailyn                   RUR Score:         5 %  Low Risk            Plan for utilizing home health:    No history, pt is not homebound  Rx: Marisela Gutiérrez  DME: None  PCP: First and Last name:                   Name of Practice:  [address] [place] [address] or Urgent Care     Are you a current patient: Yes/No: Yes   Approximate date of last visit: Unknown   Can you participate in a virtual visit with your PCP: YES                    Current Advanced Directive/Advance Care Plan:    pt does not have ACP, son Grover Feliciano (Karo Larger is NOK                        Transition of Care Plan:      1. Hospital admission for medica/surgical management, surgery consult  2. CM to follow through for treatment/response  3. ACP planning, pt declined to complete on this admission  4. D/C when stable to home w/ family assistance  5. Outpatient f/u surgery, PCP  6.  Granddaughter will transport    Care Management Interventions  PCP Verified by CM: Yes([facility] or Urgent Care, last visit 30 days)  Palliative Care Criteria Met (RRAT>21 & CHF Dx)?: No  Mode of Transport at Discharge: Self(Family)  Physical Therapy Consult: No  Occupational Therapy Consult: No  Current Support Network: Own Home, Other(pt lives w/ her 3 granddaughters in  apt, reports to be ambulatory, IADLs, drives)  Confirm Follow Up Transport: Self  Discharge Location  Discharge Placement: Home with outpatient services  SHARI Rodrigues

## 2020-07-27 NOTE — DISCHARGE SUMMARY
[address] [place] [address]   Office [phone]  Fax [phone]       Discharge / Transfer / Off-Service Note     Name: Antolin Rowe MRN: [id number]  Sex: Female   YOB: 1965  Age: 54 y.o. PCP: Andi Rubio MD     Date of admission: 7/25/2020  Date of discharge/transfer: 7/27/2020    Attending physician at admission: Annie Leone. Attending physician at discharge/transfer: Em Soto MD  Resident physician at discharge/transfer: Noreen De León DO     Consultants during hospitalization  IP CONSULT TO [facility]  IP CONSULT TO GASTROENTEROLOGY  IP CONSULT TO GASTROENTEROLOGY  IP CONSULT TO GENERAL SURGERY     Admission diagnoses   Choledocholithiasis [K80.50]    Recommended follow-up after discharge    1. PCP-Barry Oseguera MD  2. GI  3. General Surgery      Things to follow up on with PCP:   - Repeat CMP to ensure total bilirubin and LFTs are improving post-operatively    ----------------------------------------------------------------------------------------------------------------------------  The patient was well enough to be discharged from the hospital. However, because they were inpatient in a hospital, they are at greater risk of having been exposed to the coronavirus. PLEASE stay inside and self-quarantine for 14 days to prevent further spread of the coronavirus.  ------------------------------------------------------------------------------------------------------------------    Medication Changes:  No changes were made to your medications, please take all your other home medicines as previously prescribed. History of Present Illness    As per admitting provider, Dr. Rai Batch:   Mercedes Chamorro is a 54 y.o. female with known hx depression/anxiety w/ panic attacks, cholelithiasis, who [facility] complaining of a 2 day hx of gradually worsening, intermittent upper abdominal pain.  This started 2 days ago and lasted for a few hrs, but has significantly increased in frequency and intensity since 3AM this morning. Pt reports pain to be \"sharp/squeezing\" located across her upper abdomen, non-radiating, crescendo-decrescendo (max intensity 10/10). She has associated nausea but no vomiting. No change in urinary/bowel habits. No fevers/chills. She has been following a low fat diet since Dec'19 when she had a similar episode. +hx CS but otherwise no abdominal surgeries. She has not taken any meds for this episode of pain. [facility]  Yarely Bullard was admitted into the Family Medicine Service from 7/25/2020 to 7/27/2020 for Choledocholithiasis [K80.50]. During the course of this admission, the following conditions were addressed/managed:    Choledocholithiasis with Acute Cholecystitis: Seen on abd US w/ 5mm stone and 9.7mm CBD dilt; borderline GB dist+thickening. S/p IV CTX in ED. POA AST elevated at 51 but ALT + Tbili wnl. GI consulted, but d/t stone at GB neck not amenable to ERCP, general surgery was consulted and pt underwent laparoscopic cholecystectomy on 7/25. WBC, t bili, LFTs elevated postoperatively. Leukocytosis is improving, LFTs trending down, and t bili elevated but stable. MRCP on 7/27 showed no CBD stone. Per GI, t bili likely elevated due to ampullary edema.    - Follow up with general surgery and PCP     Hepatomegaly on US  Hepatomegaly w/ increased hepatic echogenicity - likely 2/2 hepatic steatosis  - F/U GI outpatient      Anxiety/Depression: Stable on home lexapro  - Continue Lexapro 5 mg      Condition at discharge: Stable.     Labs  Recent Labs     07/27/20  0253 07/26/20  1544 07/26/20  0116   WBC 9.0 12.2* 20.1*   HGB 13.3 12.8 13.8   HCT 40.1 38.1 41.2    286 340     Recent Labs     07/27/20  0253 07/26/20  1544 07/26/20  0930    136 138   K 3.6 3.7 3.8    105 105   CO2 29 28 28   BUN 11 15 13   CREA 0.75 0.78 0.66   GLU 98 110* 107*   CA 8.7 8.5 8.7     Recent Labs     07/27/20  0253 07/26/20  1544 07/26/20  0930  07/25/20  0353   * 974* 1,065*   < > 32   * 258* 242*   < > 88   TBILI 3.2* 3.1* 3.0*   < > 0.4   TP 7.4 7.1 7.0   < > 8.1   ALB 3.3* 3.1* 3.2*   < > 4.0   GLOB 4.1* 4.0 3.8   < > 4.1*   LPSE  --   --   --   --  190    < > = values in this interval not displayed. No results for input(s): PH, PCO2, PO2, TNIPOC, TROIQ, INR, PTP, APTT, FE, TIBC, PSAT, FERR, GLUCPOC, INREXT, INREXT in the last 72 hours. No lab exists for component: GLPOC    Micro:  No results found for: CULT    Imaging:  Molly Clayton Wo Cont    Result Date: 7/27/2020  EXAM: MRI ABD W WO CONT INDICATION: S/P cholecystectomy 7/25/2020 with increasing TBil. COMPARISON: Ultrasound 7/25/2020. CONTRAST: None mL Gadavist IV. TECHNIQUE: Multiplanar multisequence pre and postcontrast MRI of the abdomen was performed including axial and coronal SSFSE T2, axial dual echo in and opposed phase T1, axial FR FSE T2 fat-sat, coronal thin section T2, axial FIESTA ASPIR, paracoronal radial SSFSE T2, coronal 3-D respiratory triggered MRCP, axial pre and multiphase postcontrast LAVA T1 fat-sat, and postcontrast coronal T1 LAVA fat sat imaging. FINDINGS: VISUALIZED LOWER CHEST: Dependent atelectasis right lower lobe. Otherwise unremarkable. LIVER: Unremarkable. GALLBLADDER: Surgically absent with small fluid in the resection bed, not more than would normally be expected for recent postoperative state. BILIARY DUCTS: The common bile duct is mildly dilated to 8 mm with smooth tapering to the ampulla and no filling defects or other abnormalities. The intrahepatic bile ducts are nondilated with no evident filling defect, stricture, or mucosal abnormality. SPLEEN: Unremarkable. PANCREAS: No mass or duct dilation. No evidence of pancreas divisum. ADRENALS: Unremarkable. KIDNEYS: No mass, calculus, or hydronephrosis. STOMACH: Unremarkable. VISUALIZED BOWEL: No dilatation or wall thickening. PERITONEUM: Trace free fluid.  No pneumoperitoneum. RETROPERITONEUM: No lymphadenopathy or aortic aneurysm. VISUALIZED PELVIS: Unremarkable. BONES AND SOFT TISSUES: No acute fracture or aggressive lesion. ADDITIONAL COMMENTS: N/A     IMPRESSION: Small postoperative fluid in operative bed status post cholecystectomy. Mild 8 mm common bile duct dilation without obstructing stone or other abnormality identified. [facility]    Result Date: 7/25/2020  EXAM: Right upper quadrant limited abdominal ultrasound. CLINICAL INDICATION:  RUQ abdominal pain, nausea. . TECHNIQUE: High-resolution selected color flow evaluation of the right upper quadrant performed. COMPARISON:  None. FINDINGS: The gallbladder is distended and contains an impacted stone at the neck with 2.3 mm borderline wall thickening and no identified pericholecystic fluid or other abnormality. The patient is reported to have pain with direct insonation of the gallbladder. The common bile duct measures 9.7 mm with 5 mm stone in the duct. The liver is enlarged and diffusely echogenic with no focal lesion or intrahepatic biliary ductal dilation. There is hepatopetal portal venous flow within the liver. The pancreas appears normal with no identified mass or ductal dilation. The right kidney appears normal with no identified calculus, mass, or hydronephrosis. Right renal length measures 13.7 cm. IMPRESSION: Choledocholithiasis with 5 mm stone and 9.7 mm common bile duct dilation. Cholecystolithiasis with distended gallbladder and tenderness on insonation with borderline gallbladder wall thickness concerning for possible cholecystitis. Hepatomegaly with increased hepatic echogenicity compatible with diffuse hepatocellular process, most commonly seen in steatosis.        Procedures / Diagnostic Studies  Date of Procedure: 7/25/2020      Pre-Op Diagnosis: cholecystitis     Post-Op Diagnosis: Same as preoperative diagnosis.       Procedure(s):  CHOLECYSTECTOMY LAPAROSCOPIC     Surgeon(s):  Helen Montoya MD     Surgical Assistant: None     Anesthesia: General      Estimated Blood Loss (mL): Minimal     Complications: None     Specimens:   ID Type Source Tests Collected by Time Destination   1 : gallbladder Preservative Gallbladder   Helen Montoya MD 7/25/2020 1631 Pathology         Implants: * No implants in log *     Drains: * No LDAs found *     Findings: inflamed gallbladder, omental adhesions     Electronically Signed by Mary Ellen Cabezas MD on 7/25/2020 at 6:26 PM    Chronic diagnoses   Problem List as of 7/27/2020 Date Reviewed: 7/25/2020          Codes Class Noted - Resolved    * (Principal) Choledocholithiasis ICD-10-CM: K80.50  ICD-9-CM: 574.50  7/25/2020 - Present        Cholecystitis ICD-10-CM: K81.9  ICD-9-CM: 575.10  7/25/2020 - Present        Hepatomegaly ICD-10-CM: R16.0  ICD-9-CM: 789.1  7/25/2020 - Present        Panic disorder ICD-10-CM: F41.0  ICD-9-CM: 300.01  8/6/2018 - Present        Anxiety ICD-10-CM: F41.9  ICD-9-CM: 300.00  8/6/2018 - Present              Current Discharge Medication List      CONTINUE these medications which have NOT CHANGED    Details   aspirin 81 mg chewable tablet Take 81 mg by mouth daily. escitalopram oxalate (LEXAPRO) 10 mg tablet TAKE ONE TABLET BY MOUTH ONCE OVER 24 HOURS  Qty: 90 Tab, Refills: 3    Associated Diagnoses: Anxiety; Panic disorder              Diet:  Regular diet.     Activity:  As tolerated     Disposition: Home or Self Care    Discharge instructions to patient/family  Please seek medical attention for any new or worsening symptoms particularly fever, chest pain, shortness of breath, abdominal pain, nausea, vomiting    Follow up plans/appointments  Follow-up Information     Follow up With Specialties Details Why Contact Info    Randal Luna MD Family Medicine Call on 7/30/2020 VIRTUAL appt for f/u after hospitalization on 7/30 at 10:30AM [address] [place] [address] [address] [facility]      Jerad Levine MD General Surgery Schedule an appointment as soon as possible for a visit To follow up regarding your cholecystectomy [address] [address]  [phone]      Yanet Griffiths MD Gastroenterology Schedule an appointment as soon as possible for a visit To follow up regarding hepatomegaly and possible hepatic steatosis [address]      Alfonso Kirby MD Pediatric Medicine, Family Medicine   [facility] 13  [address] [address]  7 Chhaya Mendez DO  Family Medicine Resident       For Billing    Chief Complaint   Patient presents with   Luanne Abdominal Pain       Hospital Problems  Date Reviewed: 7/25/2020          Codes Class Noted POA    * (Principal) Choledocholithiasis ICD-10-CM: K80.50  ICD-9-CM: 574.50  7/25/2020 Unknown        Cholecystitis ICD-10-CM: K81.9  ICD-9-CM: 575.10  7/25/2020 Unknown        Hepatomegaly ICD-10-CM: R16.0  ICD-9-CM: 789.1  7/25/2020 Unknown        Panic disorder ICD-10-CM: F41.0  ICD-9-CM: 300.01  8/6/2018 Yes        Anxiety ICD-10-CM: F41.9  ICD-9-CM: 300.00  8/6/2018 Yes

## 2020-07-27 NOTE — PROGRESS NOTES
Bedside and Verbal shift change report given to Damaris Connell RN (oncoming nurse) by Ventura Toth RN (offgoing nurse). Report included the following information SBAR, Kardex and ED Summary.

## 2020-07-27 NOTE — PROGRESS NOTES
Discharge instructions reviewed with the patient and all questions answered. Peripheral IV removed. Patient discharged home with family via wheelchair.

## 2020-07-27 NOTE — DISCHARGE INSTRUCTIONS
[facility] [place] / [id number] : 1965    Admission date: 2020 Discharge date: 2020      Please bring this form with you to show your care provider at your follow-up appointment. Primary care provider:  Carlos Waddell MD    Discharging provider:  Sarina Roberson DO  - Family Medicine Resident  Rey Dumont MD - Family Medicine Attending      You have been admitted to the hospital with the following diagnoses:    ACUTE DIAGNOSES:  Choledocholithiasis [K80.50]    . . . . . . . . . . . . . . . . . . . . . . . . . . . . . . . . . . . . . . . . . . . . . . . . . . . . . . . . . . . . . . . . . . . . . . . .   You are well enough to be discharged from the hospital. However, because you were inpatient in a hospital, you are at greater risk of having been exposed to the coronavirus. PLEASE stay inside and self-quarantine for 14 days to prevent further spread of the coronavirus. . . . . . . . . . . . . . . . . . . . . . . . . . . . . . . . . . . . . . . . . . . . . . . . . . . . . . . . . . . . . . . . . . . . . . . . Manuel Plaster      FOLLOW-UP CARE RECOMMENDATIONS:    Appointments  Follow-up Information     Follow up With Specialties Details Why Contact Info    Randal Luna MD Family Medicine Call on 2020 VIRTUAL appt for f/u after hospitalization on  at 10:30AM [address] [place] [address]      Helen Montoya MD General Surgery Schedule an appointment as soon as possible for a visit To follow up regarding your cholecystectomy [address]  [address] [place] [address]      Gayathri Tello MD Gastroenterology Schedule an appointment as soon as possible for a visit To follow up regarding hepatomegaly and possible hepatic steatosis [address]  [phone]               Follow-up tests needed: repeat CMP to follow up on bilirubin level and liver function tests, repeat CBC to check WBC     DIET/what to eat:  Low Fat Diet    ACTIVITY:  Limit your exercise for the first week, although stairs or other walking is fine. No strenuous activity (No lifting >10-15lbs) for one month. No lifting >10lbs for 4 weeks from day of surgery    Wound care: Keep surgical scars clean and dry      Specific symptoms to watch for: chest pain, shortness of breath, fever, chills, nausea, vomiting, diarrhea, change in mentation, falling, weakness, bleeding. What to do if new or unexpected symptoms occur? If you experience any of the above symptoms (or should other concerns or questions arise after discharge) please call your primary care physician. Return to the emergency room if you cannot get hold of your doctor. · It is very important that you keep your follow-up appointment(s). · Please bring discharge papers, medication list (and/or medication bottles) to your follow-up appointments for review by your outpatient provider(s). · Please check the list of medications and be sure it includes every medication (even non-prescription medications) that your provider wants you to take. · It is important that you take the medication exactly as they are prescribed. · Keep your medication in the bottles provided by the pharmacist and keep a list of the medication names, dosages, and times to be taken in your wallet. · Do not take other medications without consulting your doctor. · If you have any questions about your medications or other instructions, please talk to your nurse or care provider before you leave the hospital.     Information obtained by:     I understand that if any problems occur once I am at home I am to contact my physician. These instructions were explained to me and I had the opportunity to ask questions. I understand and acknowledge receipt of the instructions indicated above. Physician's or R.N.'s Signature                                                                  Date/Time                                                                                                                                              Patient or Representative Signature                                                          Date/Time        POST-OPERATIVE INSTRUCTIONS  OUTPATIENT SURGERY CHOLECYSTECTOMY    Today you underwent a cholecystectomy which is usually well tolerated. However, below is a list of instructions which are important for you to follow. If you have any questions, please call your surgeons office. 1 EATING: Please eat lightly when you go home today for the first 24 hours. You may resume your regular diet after that. 2. EXERCISE: Limit your exercise for the first week, although stairs or other walking is fine. No strenuous activity (No lifting >10-15lbs) for one month. 3. DRESSING: You may shower in 1 day, unless otherwise instructed by your surgeon. No pools, baths, or hot tubs for 2 weeks. 4. NO LIFTING: No lifting >10lbs for 4 weeks from day of surgery    5. DRIVING: No driving while taking prescription pain medicine, and until post-operative discomfort resolves. Usually you may begin driving within 1-2 weeks. 6. PAIN: A prescription for pain has been given to you or your family. Please use it as prescribed and if this is inadequate, please call your surgeons office. In some cases, Tylenol or Advil may be adequate; and in that case, you will not need to fill the prescription. Please call for any refills during office hours. Pain medication may cause constipation - Colace or Milk of Magnesia may be used as needed. May take Tylenol 1000mg every 6 hours as needed for pain  May take Ibuprofen 600-800mg every 6 hours as needed for pain      7.  WOUND: If you notice any increased redness, swelling, pain or fever, please call the office. If this is noticed at a time after normal office hours, please call our answering service. 8. APPOINTMENT: Your surgeon would like to see you in his/her in 14 days. If this appointment has not been made for you prior to your departure from Outpatient Surgery, please call your surgeons office to schedule your appointment. If you have any questions or concerns, please do not hesitate to call your surgeon or any other staff member who will be able to assist you.       [facility]  [address]   [address]  [place], [address] [place] [address] Pkw[address]  [phone]

## 2020-07-27 NOTE — PROGRESS NOTES
[address] NP  [phone]           GI PROGRESS NOTE        NAME: Hans Saucedo   :  1965   MRN:  [id number]       Subjective:   Reports incisional pain after surgery. Wants to eat. Objective:   NAD      VITALS:   Last 24hrs VS reviewed since prior progress note. Most recent are:  Visit Vitals  /81 (BP 1 Location: Right arm, BP Patient Position: At rest;Sitting)   Pulse 77   Temp 98.7 °F (37.1 °C)   Resp 18   Ht 5' 10\" (1.778 m)   Wt 86.6 kg (190 lb 14.7 oz)   SpO2 95%   BMI 27.39 kg/m²     No intake or output data in the 24 hours ending 20 1543    PHYSICAL EXAM:  General: Alert, in no acute distress    HEENT: Anicteric sclerae. Lungs:            CTA Bilaterally. Heart:  Regular  rhythm,    Abdomen: Soft, Non distended, Non tender. hypoactive bowel sounds. TTP right side of abdomen, no HSM  Extremities: No c/c/e  Neurologic:  CN 2-12 gi, Alert and oriented X 3. No acute neurological distress   Psych:   Good insight. Not anxious nor agitated. Lab Data Reviewed:   Recent Labs     203 20  1544   WBC 9.0 12.2*   HGB 13.3 12.8   HCT 40.1 38.1    286     Recent Labs     20  0253 20  1544    136   K 3.6 3.7    105   CO2 29 28   BUN 11 15   CREA 0.75 0.78   GLU 98 110*   CA 8.7 8.5     Recent Labs     20  0253 20  1544  20  0353   * 258*   < > 88   TP 7.4 7.1   < > 8.1   ALB 3.3* 3.1*   < > 4.0   GLOB 4.1* 4.0   < > 4.1*   LPSE  --   --   --  190    < > = values in this interval not displayed. ________________________________________________________________________  Patient Active Problem List   Diagnosis Code    Panic disorder F41.0    Anxiety F41.9    Choledocholithiasis K80.50    Cholecystitis K81.9    Hepatomegaly R16.0         Assessment and Plan:  Gallstones, Elevated LFTs:  LFTs improving.   MRCP is negative for obstructive stone  - GI lite diet  - Supportive care    Will sign off.         Signed By: Asa Kinney NP     7/27/2020  3:43 PM

## 2020-07-28 ENCOUNTER — PATIENT OUTREACH (OUTPATIENT)
Dept: CASE MANAGEMENT | Age: 55
End: 2020-07-28

## 2020-07-28 NOTE — PROGRESS NOTES
Patient contacted regarding recent discharge and COVID-19 risk. Discussed COVID-19 related testing which was not done at this time. Test results were not done. Care Transition Nurse/ Ambulatory Care Manager contacted the parent by telephone to perform post discharge assessment. Verified name and  with patient as identifiers. Patient has following risk factors of: no known risk factors. CTN/ACM reviewed discharge instructions, medical action plan and red flags related to discharge diagnosis. Reviewed and educated them on any new and changed medications related to discharge diagnosis. Patient denies N/V. She is eating and drinking. She has incision pain but feels it is controlled with tylenol. She reports incision looks \"good\" D/I. She notes no concerns or questions about post-op care. Advance Care Planning:   Does patient have an Advance Directive: not on file; education provided   Patient reports Healthcare decision maker:  Primary- Kelsey Andrew (son) [phone]    Education provided regarding infection prevention, and signs and symptoms of COVID-19 and when to seek medical attention with patient who verbalized understanding. Discussed exposure protocols and quarantine from [address] [place] [address] you at higher risk for severe illness  and given an opportunity for questions and concerns. The patient agrees to contact PCP office for questions related to their healthcare. CTN/ACM provided contact information for future reference. Patient reports isolating at home. She wears a mask around other family members. She is caution d/t recent hospital stay. Patient denies any educational needs related to L[facility]. Patient scheduled follow up VV with surgery- for   She cancelled her PCP VV for , but will reschedule if she feels it is needed. Plan for follow-up call in 7-14 days based on severity of symptoms and risk factors.

## 2025-02-04 NOTE — PROGRESS NOTES
Night shift RN told RN that pt needs a repeat covid test d/t an indeterminate reading. RN called Family Practice MD, Dr. Geri Blum, about this, since pt had covid test in order for her to receive surgery. MD instructed RN to hold off on the repeat swab. Will continue to monitor. Tried contacting pt. Number not available.

## (undated) DEVICE — SUTURE SZ 0 27IN 5/8 CIR UR-6  TAPER PT VIOLET ABSRB VICRYL J603H

## (undated) DEVICE — TOWEL SURG W17XL27IN STD BLU COT NONFENESTRATED PREWASHED

## (undated) DEVICE — SUTURE VCRL SZ 4-0 L27IN ABSRB UD L19MM PS-2 3/8 CIR PRIM J426H

## (undated) DEVICE — SOL IRRIGATION INJ NACL 0.9% 500ML BTL

## (undated) DEVICE — SURGICAL PROCEDURE KIT GEN LAPAROSCOPY LF

## (undated) DEVICE — CANISTER, RIGID, 3000CC: Brand: MEDLINE INDUSTRIES, INC.

## (undated) DEVICE — LAPAROSCOPIC TROCAR SLEEVE/SINGLE USE: Brand: KII® OPTICAL ACCESS SYSTEM

## (undated) DEVICE — NEEDLE HYPO 22GA L1.5IN BLK S STL HUB POLYPR SHLD REG BVL

## (undated) DEVICE — STRAP,POSITIONING,KNEE/BODY,FOAM,4X60": Brand: MEDLINE

## (undated) DEVICE — PREP SKN CHLRAPRP APL 26ML STR --

## (undated) DEVICE — DRAPE,REIN 53X77,STERILE: Brand: MEDLINE

## (undated) DEVICE — COVER LT HNDL PLAS RIG 1 PER PK

## (undated) DEVICE — DEVICE TRNSF SPIK STL 2008S] MICROTEK MEDICAL INC]

## (undated) DEVICE — CLICKLINE SCISSORS INSERT: Brand: CLICKLINE

## (undated) DEVICE — DERMABOND SKIN ADH 0.7ML -- DERMABOND ADVANCED 12/BX

## (undated) DEVICE — REM POLYHESIVE ADULT PATIENT RETURN ELECTRODE: Brand: VALLEYLAB

## (undated) DEVICE — LAPAROSCOPIC WIRE L-HOOK ELECTRODE COATED: Brand: CLEANCOAT

## (undated) DEVICE — TROCAR: Brand: KII® OPTICAL ACCESS SYSTEM

## (undated) DEVICE — CLIP LIG ABSRB HEM-LOK LG PUR --

## (undated) DEVICE — TROCAR: Brand: KII® SLEEVE

## (undated) DEVICE — STERILE POLYISOPRENE POWDER-FREE SURGICAL GLOVES: Brand: PROTEXIS

## (undated) DEVICE — INFECTION CONTROL KIT SYS

## (undated) DEVICE — INSUFFLATION NEEDLE: Brand: SURGINEEDLE

## (undated) DEVICE — TISSUE RETRIEVAL SYSTEM: Brand: INZII RETRIEVAL SYSTEM